# Patient Record
Sex: FEMALE | Race: WHITE | NOT HISPANIC OR LATINO | Employment: OTHER | RURAL
[De-identification: names, ages, dates, MRNs, and addresses within clinical notes are randomized per-mention and may not be internally consistent; named-entity substitution may affect disease eponyms.]

---

## 2017-01-10 ENCOUNTER — HISTORICAL (OUTPATIENT)
Dept: ADMINISTRATIVE | Facility: HOSPITAL | Age: 67
End: 2017-01-10

## 2017-01-12 LAB
LAB AP CLINICAL INFORMATION: NORMAL
LAB AP COMMENTS: NORMAL
LAB AP GENERAL CAT - HISTORICAL: NORMAL
LAB AP INTERPRETATION/RESULT - HISTORICAL: NEGATIVE
LAB AP SPECIMEN ADEQUACY - HISTORICAL: NORMAL
LAB AP SPECIMEN SUBMITTED - HISTORICAL: NORMAL

## 2019-01-15 ENCOUNTER — HISTORICAL (OUTPATIENT)
Dept: ADMINISTRATIVE | Facility: HOSPITAL | Age: 69
End: 2019-01-15

## 2020-11-04 ENCOUNTER — HISTORICAL (OUTPATIENT)
Dept: ADMINISTRATIVE | Facility: HOSPITAL | Age: 70
End: 2020-11-04

## 2021-02-02 ENCOUNTER — HISTORICAL (OUTPATIENT)
Dept: ADMINISTRATIVE | Facility: HOSPITAL | Age: 71
End: 2021-02-02

## 2021-11-29 ENCOUNTER — HOSPITAL ENCOUNTER (OUTPATIENT)
Dept: RADIOLOGY | Facility: HOSPITAL | Age: 71
Discharge: HOME | End: 2021-11-29
Payer: MEDICARE

## 2021-11-29 VITALS — BODY MASS INDEX: 35.36 KG/M2 | HEIGHT: 66 IN | WEIGHT: 220 LBS

## 2021-11-29 DIAGNOSIS — Z12.31 VISIT FOR SCREENING MAMMOGRAM: ICD-10-CM

## 2021-11-29 PROCEDURE — 77067 SCR MAMMO BI INCL CAD: CPT | Mod: 26,,, | Performed by: RADIOLOGY

## 2021-11-29 PROCEDURE — 77067 MAMMO DIGITAL SCREENING BILAT: ICD-10-PCS | Mod: 26,,, | Performed by: RADIOLOGY

## 2021-11-29 PROCEDURE — 77067 SCR MAMMO BI INCL CAD: CPT | Mod: TC

## 2022-12-12 ENCOUNTER — HOSPITAL ENCOUNTER (OUTPATIENT)
Dept: RADIOLOGY | Facility: HOSPITAL | Age: 72
Discharge: HOME | End: 2022-12-12
Payer: MEDICARE

## 2022-12-12 DIAGNOSIS — Z12.31 VISIT FOR SCREENING MAMMOGRAM: ICD-10-CM

## 2022-12-12 PROCEDURE — 77067 SCR MAMMO BI INCL CAD: CPT | Mod: 26,,, | Performed by: RADIOLOGY

## 2022-12-12 PROCEDURE — 77067 SCR MAMMO BI INCL CAD: CPT | Mod: TC

## 2022-12-12 PROCEDURE — 77067 MAMMO DIGITAL SCREENING BILAT: ICD-10-PCS | Mod: 26,,, | Performed by: RADIOLOGY

## 2023-09-12 DIAGNOSIS — I63.89 OTHER CEREBRAL INFARCTION: Primary | ICD-10-CM

## 2023-09-14 ENCOUNTER — CLINICAL SUPPORT (OUTPATIENT)
Dept: REHABILITATION | Facility: HOSPITAL | Age: 73
End: 2023-09-14
Payer: MEDICARE

## 2023-09-14 DIAGNOSIS — I63.89 OTHER CEREBRAL INFARCTION: Primary | ICD-10-CM

## 2023-09-14 DIAGNOSIS — R29.898 WEAKNESS OF RIGHT UPPER EXTREMITY: ICD-10-CM

## 2023-09-14 PROCEDURE — 97110 THERAPEUTIC EXERCISES: CPT | Mod: PN

## 2023-09-14 PROCEDURE — 97161 PT EVAL LOW COMPLEX 20 MIN: CPT | Mod: PN

## 2023-09-14 NOTE — PLAN OF CARE
[facility]   Physical Therapy Initial Evaluation    Date: 9/14/2023   Name: Paty Jennings  Clinic Number: [id number]    Therapy Diagnosis:   Encounter Diagnoses   Name Primary?    Other cerebral infarction Yes    Weakness of right upper extremity      Physician: Lennox Saunders DO    Physician Orders: PT Eval and Treat    Medical Diagnosis from Referral: cva   Evaluation Date: 9/14/2023  Updated Plan of Care Due : 10/15/2023  Authorization Period Expiration: medicare   Plan of Care Expiration: end of year   Visit # / Visits authorized: 1/ 20    Time In: 930  Time Out: 1015  Total Appointment Time (timed & untimed codes): 45 minutes    Precautions: Standard    Subjective   Date of onset: pt states she had a light stroke on Tuesday. Pt voices she didn't have a bleed and has mri scheduled.  Pt voices she was having tingling in her arm and trouble using it. She voices that she has been having trouble raising her shoulder and decreased  right   History of current condition - Paty reports: hx below      Medical History:   Past Medical History:   Diagnosis Date    Breast cancer        Surgical History:   Paty Jennings  has a past surgical history that includes Breast biopsy and Breast lumpectomy.    Medications:   Paty currently has no medications in their medication list.    Allergies:   Review of patient's allergies indicates:  Not on File     Imaging, recent ct :      Prior Therapy: none   Social History:   lives with their spouse  Occupation: retired   Prior Level of Function: independent   Current Level of Function: decreased  , decreased balance     Pain:  no pain at present   Patients goals: increased  to same as left, decrease numbness dead weight feeling in her right arm,  increase dexterity in her right hand     Objective       Observation :     Forward Head/Rounded Shoulders :  [x] Yes   [] No   Scapular Winging :  [x] Yes   [] No            Comments :     Cervical Spine  Clearing :        Range of Motion/Strength :                  Left Extremity                                                                        Right Extremity     AROM   PROM  Strength                  Location   AROM    PROM   Strength    170   5   Shoulder    Flexion 139  2+   170  5                      Abduction   90  2+                             45  5                      ER in Adduction 40  2+                          t6  5                      Functional IR T6   2+                           ER in 90 Scaption      50  5    Upper extremity wb on scales        40  2+   140     Elbow         Flexion 140     0                        Extension 0     50lb                    25 lb          Functional Impairments : decreased  , decreased shoulder range of motion           Rotator Cuff : weak rotator cuff        Palpation :  no pain with palpation , soreness in right pectoralis  ac joint                     Limitation/Restriction for FOTO neuro upper extremity  Survey    Therapist reviewed FOTO scores for Paty Jennings on 9/14/2023.   FOTO documents entered into [employer] - see Media section.    Limitation Score: 56 %         TREATMENT          Paty received the treatments listed below:  THERAPEUTIC EXERCISES to develop strength, endurance, ROM, flexibility, and posture for 20  minutes including home ex program         Home Exercises and Patient Education Provided    Education provided:   - pt received home ex program     Written Home Exercises Provided: yes.  Exercises were reviewed and Paty was able to demonstrate them prior to the end of the session.  Paty demonstrated good  understanding of the education provided.     See EMR under Patient Instructions for exercises provided 9/14/2023.    Assessment   Paty is a 72 y.o. female referred to outpatient Physical Therapy with a medical diagnosis of cva . Patient presents with decreased balance right lower extremity and decreased proximal  shoulder strength with decreased rotator cuff strength, decreased  right hand.      Patient prognosis is Excellent.   Patientt will benefit from skilled outpatient Physical Therapy to address the deficits stated above and in the chart below, provide patient /family education, and to maximize patientt's level of independence.     Plan of care discussed with patient: Yes  Patient's spiritual, cultural and educational needs considered and patient is agreeable to the plan of care and goals as stated below:     Anticipated Barriers for therapy: medical diagnosis of cva . Patient presents with decreased balance right lower extremity and decreased proximal shoulder strength with decreased rotator cuff strength, decreased  right hand.    Goals:  Short Term Goals: 4  weeks   Pt will be independent with home ex program   Pt will be able to increas e bilateral shoulder range of motion flex and abd to 160 arom   Be able to increase right upper extremity wb to 50lb   Increase right  to 40b  Be able to stand on right lower extremity x 3 sec       Long Term Goals: 6 weeks   Have full painfree range of motion right shoulder with activity   Increase  right equal to left   Perform all fine motor skills in right upper extremity without difficulty     Plan   Plan of care Certification: 9/14/2023 to 10/14/2023.    Outpatient Physical Therapy 2 times weekly for 4 weeks to include the following interventions: Manual Therapy, Neuromuscular Re-ed, Patient Education, and Therapeutic Exercise      Plan of care has been reestablished with Vivian Duckworth LPTA, Cayla Frausto LPTA, Liza Ndiaye LPTA  and Princess Christensen LPTA.   .     Osman Soriano, PT           I CERTIFY THE NEED FOR THESE SERVICES FURNISHED UNDER THIS PLAN OF TREATMENT AND WHILE UNDER MY CARE.    Physician's comments:      Physician's Signature: ___________________________________________________

## 2023-09-18 ENCOUNTER — CLINICAL SUPPORT (OUTPATIENT)
Dept: REHABILITATION | Facility: HOSPITAL | Age: 73
End: 2023-09-18
Payer: MEDICARE

## 2023-09-18 DIAGNOSIS — I63.89 OTHER CEREBRAL INFARCTION: ICD-10-CM

## 2023-09-18 DIAGNOSIS — R29.898 WEAKNESS OF RIGHT UPPER EXTREMITY: Primary | ICD-10-CM

## 2023-09-18 PROCEDURE — 97110 THERAPEUTIC EXERCISES: CPT | Mod: PN,CQ

## 2023-09-18 PROCEDURE — 97112 NEUROMUSCULAR REEDUCATION: CPT | Mod: PN,CQ

## 2023-09-18 NOTE — PROGRESS NOTES
Physical Therapy Treatment Note     Name: Paty Jennings  Clinic Number: [id number]    Therapy Diagnosis:   Encounter Diagnoses   Name Primary?    Other cerebral infarction     Weakness of right upper extremity Yes     Physician: Lennox Saunders DO    Visit Date: 9/18/2023    Physician Orders: PT Eval and Treat    Medical Diagnosis from Referral: cva   Evaluation Date: 9/14/2023  Updated Plan of Care Due : 10/15/2023  Authorization Period Expiration: medicare   Plan of Care Expiration: end of year   Visit # / Visits authorized: 2/ 20  PTA Visit #: 1    Time In: 1315  Time Out: 1355  Total Billable Time: 40 minutes    Precautions: Standard    Received Plan of Care per Osman Soriano PT     Subjective     Pt reports: no pain; still weak in right upper extremity   She was compliant with home exercise program.  Response to previous treatment: no c/o   Functional change: none yet    Pain: 0/10  Location: right arm    Received Plan of Care per Osman Soriano PT     Objective     Paty received therapeutic exercises to develop strength, ROM, flexibility, and posture for 15 minutes including:  UBE x 4 minutes (fwd/bwd)  Pulleys x 4 minutes   Corner pec stretching, 4x10 second hold   Towel wall slides for BUE flexion x 20 repetitions   Shoulder flexion with 1# weight x 20 repetitions   Elbow flexion/extension with 2# weight x 20 repetitions   Wrist flexion/extension with 2# weight x 20 repetitions     Paty participated in neuromuscular re-education activities to improve: Coordination and Proprioception for 25 minutes. The following activities were included:  BUE scapular retraction with rows and extension x 20 repetitions each, blue  Green web  for flexion, extension x 20 repetitions each  Green web for wrist flexion and extension x 20 repetitions each  Red resistance band flexion, abduction, elbow flexion x 20 repetitions each  Horizontal abduction x 20 repetitions with red band   Gripper for  pronation/supination x 20 repetitions   BUE weight bearing on mat for weight shifting lateral left/right, forward/backward x 10 repetitions aide    Paty participated in dynamic functional therapeutic activities to improve functional performance for 0  minutes, including:      Paty received the following direct contact modalities after being cleared for contraindications:     Paty received the following supervised modalities after being cleared for contradictions:     Home Exercises Provided and Patient Education Provided     Education provided: continue current home exercise program after review, pain free; added red resistance band for use with home exercise program; edu pt may put dried peas/beans in putty for fine motor work    Written Home Exercises Provided: Patient instructed to cont prior HEP.  Exercises were reviewed and Paty was able to demonstrate them prior to the end of the session.  Paty demonstrated good  understanding of the education provided.     See EMR under Patient Instructions for exercises provided  9/14/2023 .    Assessment     Pt gradually improving range of motion and strength; fine motor strength is most limiting at this time per subjective report  Paty Is progressing well towards her goals.   Pt prognosis is Excellent.     Pt will continue to benefit from skilled outpatient physical therapy to address the deficits listed in the problem list box on initial evaluation, provide pt/family education and to maximize pt's level of independence in the home and community environment.      Anticipated barriers to physical therapy: home exercise program compliance     Goals:  Short Term Goals: 4  weeks   Pt will be independent with home ex program   Pt will be able to increase bilateral shoulder range of motion flex and abd to 160 arom   Be able to increase right upper extremity wb to 50lb   Increase right  to 40lb  Be able to stand on right lower extremity x 3 sec      Long  Term Goals: 6 weeks   Have full painfree range of motion right shoulder with activity   Increase  right equal to left   Perform all fine motor skills in right upper extremity without difficulty     Plan     Plan of care Certification: 9/14/2023 to 10/14/2023.     Outpatient Physical Therapy 2 times weekly for 4 weeks to include the following interventions: Manual Therapy, Neuromuscular Re-ed, Patient Education, and Therapeutic Exercise    Continue per Plan of Care and progress as pt able   Cayla Frausto, PTA  9/18/2023

## 2023-09-22 ENCOUNTER — CLINICAL SUPPORT (OUTPATIENT)
Dept: REHABILITATION | Facility: HOSPITAL | Age: 73
End: 2023-09-22
Payer: MEDICARE

## 2023-09-22 DIAGNOSIS — I63.89 OTHER CEREBRAL INFARCTION: Primary | ICD-10-CM

## 2023-09-22 DIAGNOSIS — R29.898 WEAKNESS OF RIGHT UPPER EXTREMITY: ICD-10-CM

## 2023-09-22 PROCEDURE — 97112 NEUROMUSCULAR REEDUCATION: CPT | Mod: PN,CQ

## 2023-09-22 PROCEDURE — 97110 THERAPEUTIC EXERCISES: CPT | Mod: PN,CQ

## 2023-09-22 NOTE — PROGRESS NOTES
Physical Therapy Treatment Note     Name: Paty Jennings  Clinic Number: [id number]    Therapy Diagnosis:   Encounter Diagnoses   Name Primary?    Other cerebral infarction Yes    Weakness of right upper extremity      Physician: Lennox Saunders DO    Visit Date: 9/22/2023    Physician Orders: PT Eval and Treat    Medical Diagnosis from Referral: cva right side   Evaluation Date: 9/14/2023  Updated Plan of Care Due : 10/15/2023  Authorization Period Expiration: medicare   Plan of Care Expiration: end of year 20 visits  Visit # / Visits authorized: 3/ 8  PTA Visit #: 2    Time In: 1315  Time Out: 1355  Total Billable Time: 40 minutes    Precautions: Standard    Received Plan of Care per Osman Soriano PT     Subjective     Pt reports: My arm just feels a little heavier than it should other than that, I'm good.  She was compliant with home exercise program.  Response to previous treatment: no c/o   Functional change: ongoing     Pain: 0/10  Location: right arm    Received Plan of Care per Osman Soriano PT     Objective     Paty received therapeutic exercises to develop strength, ROM, flexibility, and posture for 20 minutes including:  UBE x 5' minutes (fwd/bwd)  Pulleys x 4 minutes   Wrist flexion, extension, ulnar deviation and radial deviation 15 x 3#  Pronation/ supination 15 x 16 oz  Ulnar deviation/ radial deviation 15 x 16 oz.  Red theraband flexion, extension, external rotation, internal rotation all x 10  Pinch  red 1st-4th digits red x 15, 5th digit yellow x 15   Finger adduction squeezed x 10  Double support squats total gym x 20  Double support calf raises total gym x 15      41#  Left shoulder flexion 143    Paty participated in neuromuscular re-education activities to improve: Coordination and Proprioception for 20 minutes. The following activities were included:  BUE scapular retraction with rows and extension x 20 repetitions each, blue  Green web  for flexion, extension x 20  repetitions each  Green web for wrist flexion and extension x 20 repetitions each  Gripper for pronation/supination x 20 repetitions   Slant board x 2'    Home Exercises Provided and Patient Education Provided     Education provided: continue current home exercise program     Written Home Exercises Provided: Patient instructed to cont prior HEP.  Exercises were reviewed and Paty was able to demonstrate them prior to the end of the session.  Paty demonstrated good  understanding of the education provided.     See EMR under Patient Instructions for exercises provided  9/14/2023 .    Assessment     Pt had 16# gain with  strength this treatment, able to left single support balance x 10 secs  Paty Is progressing well towards her goals.   Pt prognosis is Excellent.     Pt will continue to benefit from skilled outpatient physical therapy to address the deficits listed in the problem list box on initial evaluation, provide pt/family education and to maximize pt's level of independence in the home and community environment.      Anticipated barriers to physical therapy: home exercise program compliance     Goals:  Short Term Goals: 4  weeks   Pt will be independent with home ex program   Pt will be able to increase bilateral shoulder range of motion flex and abd to 160 arom   Be able to increase right upper extremity wb to 50lb   Increase right  to 40lb  Be able to stand on right lower extremity x 3 sec -met     Long Term Goals: 6 weeks   Have full painfree range of motion right shoulder with activity   Increase  right equal to left   Perform all fine motor skills in right upper extremity without difficulty     Plan     Plan of care Certification: 9/14/2023 to 10/14/2023.     Outpatient Physical Therapy 2 times weekly for 4 weeks to include the following interventions: Manual Therapy, Neuromuscular Re-ed, Patient Education, and Therapeutic Exercise    Continue per Plan of Care and progress as pt able    Jacquelyn Duckworth, PTA  9/22/2023

## 2023-09-25 ENCOUNTER — CLINICAL SUPPORT (OUTPATIENT)
Dept: REHABILITATION | Facility: HOSPITAL | Age: 73
End: 2023-09-25
Payer: MEDICARE

## 2023-09-25 DIAGNOSIS — R29.898 WEAKNESS OF RIGHT UPPER EXTREMITY: Primary | ICD-10-CM

## 2023-09-25 DIAGNOSIS — I63.89 OTHER CEREBRAL INFARCTION: ICD-10-CM

## 2023-09-25 PROCEDURE — 97112 NEUROMUSCULAR REEDUCATION: CPT | Mod: PN

## 2023-09-25 PROCEDURE — 97110 THERAPEUTIC EXERCISES: CPT | Mod: PN

## 2023-09-25 NOTE — PROGRESS NOTES
Physical Therapy Treatment Note     Name: Paty Jennings  Clinic Number: [id number]    Therapy Diagnosis:   No diagnosis found.    Physician: Lennox Saunders DO    Visit Date: 9/25/2023    Physician Orders: PT Eval and Treat    Medical Diagnosis from Referral: cva right side   Evaluation Date: 9/14/2023  Updated Plan of Care Due : 10/15/2023  Authorization Period Expiration: medicare   Plan of Care Expiration: end of year 20 visits  Visit # / Visits authorized: 4/ 8  PTA Visit #:     Time In: 1315  Time Out: 1355  Total Billable Time: 40 minutes    Precautions: Standard    Received Plan of Care per Osman Soriano PT     Subjective     Pt reports: still feeling a little weird with writing and tingling feeling , pt voices she can now fasten her bra.  She was compliant with home exercise program.  Response to previous treatment: no c/o   Functional change: ongoing     Pain: 0/10  Location: right arm    Received Plan of Care per Osman Soriano PT     Objective     Paty received therapeutic exercises to develop strength, ROM, flexibility, and posture for 20 minutes including:  UBE x 5' minutes (fwd/bwd)      Wrist flexion, extension, ulnar deviation and radial deviation 15 x 3#  Pronation/ supination 15 x 16 oz  Ulnar deviation/ radial deviation 15 x 16 oz.  Red theraband flexion, extension, external rotation, internal rotation all x 10  Pinch  red 1st-4th digits red x 15, 5th digit yellow x 15   Finger adduction squeezed x 10  Double support squats total gym x 20  Double support calf raises total gym x 15      38#   Right shoulder flexion 163    Paty participated in neuromuscular re-education activities to improve: Coordination and Proprioception for 20 minutes. The following activities were included:  BUE scapular retraction with rows and extension x 20 repetitions each, blue  Green web  for flexion, extension x 20 repetitions each  Green web for wrist flexion and extension x 20 repetitions  each  Gripper for pronation/supination x 20 repetitions   Slant board x 2'  Weight bearing on scales shoulder at 90 degrees on wall  with 2lb ball   15lb max  Quadraped on scales  25lb   x 15 reps   Quadraped alternating upper extremity x 10 reps each   Quadraped alternating lower extremit x 10 reps each   Alternating contralateral upper extremity and lower extremity  x 10 reps   Home Exercises Provided and Patient Education Provided     Education provided: continue current home exercise program     Written Home Exercises Provided: Patient instructed to cont prior HEP.  Exercises were reviewed and Paty was able to demonstrate them prior to the end of the session.  Paty demonstrated good  understanding of the education provided.     See EMR under Patient Instructions for exercises provided  9/14/2023 .    Assessment     Pt had 16# gain with  strength this treatment, able to left single support balance x 10 secs  Paty Is progressing well towards her goals.   Pt prognosis is Excellent.     Pt will continue to benefit from skilled outpatient physical therapy to address the deficits listed in the problem list box on initial evaluation, provide pt/family education and to maximize pt's level of independence in the home and community environment.      Anticipated barriers to physical therapy: home exercise program compliance     Goals:  Short Term Goals: 4  weeks   Pt will be independent with home ex program   Pt will be able to increase bilateral shoulder range of motion flex and abd to 160 arom   Be able to increase right upper extremity wb to 50lb   Increase right  to 40lb  Be able to stand on right lower extremity x 3 sec -met     Long Term Goals: 6 weeks   Have full painfree range of motion right shoulder with activity   Increase  right equal to left   Perform all fine motor skills in right upper extremity without difficulty     Plan     Plan of care Certification: 9/14/2023 to 10/14/2023.      Outpatient Physical Therapy 2 times weekly for 4 weeks to include the following interventions: Manual Therapy, Neuromuscular Re-ed, Patient Education, and Therapeutic Exercise    Plan of care has been reestablished with Vivian MELÉNDEZ, Cayla MELÉNDEZ, Liza MELÉNDEZ  and Princess MELÉNDEZ.     Osman Soriano, PT  9/25/2023

## 2023-09-27 ENCOUNTER — CLINICAL SUPPORT (OUTPATIENT)
Dept: REHABILITATION | Facility: HOSPITAL | Age: 73
End: 2023-09-27
Payer: MEDICARE

## 2023-09-27 DIAGNOSIS — I63.89 OTHER CEREBRAL INFARCTION: ICD-10-CM

## 2023-09-27 DIAGNOSIS — R29.898 WEAKNESS OF RIGHT UPPER EXTREMITY: Primary | ICD-10-CM

## 2023-09-27 PROCEDURE — 97112 NEUROMUSCULAR REEDUCATION: CPT | Mod: PN,CQ

## 2023-09-27 PROCEDURE — 97110 THERAPEUTIC EXERCISES: CPT | Mod: PN,CQ

## 2023-09-27 NOTE — PROGRESS NOTES
Physical Therapy Treatment Note     Name: Paty Jennings  Clinic Number: [id number]    Therapy Diagnosis:   Encounter Diagnoses   Name Primary?    Weakness of right upper extremity Yes    Other cerebral infarction        Physician: Lennox Saunders DO    Visit Date: 9/27/2023    Physician Orders: PT Eval and Treat    Medical Diagnosis from Referral: cva right side   Evaluation Date: 9/14/2023  Updated Plan of Care Due : 10/15/2023  Authorization Period Expiration: medicare   Plan of Care Expiration: end of year 20 visits  Visit # / Visits authorized: 5/ 8  PTA Visit #: 1    Time In: 115  Time Out: 200  Total Billable Time: 45 minutes    Precautions: Standard    Received Plan of Care per Osman Soriano PT     Subjective     Pt reports: I cooked apple  mini cakes today, peeled apples and chopped them. I was sore from last treatment.  She was compliant with home exercise program.  Response to previous treatment: no c/o   Functional change: ongoing     Pain: 0/10  Location: right arm    Received Plan of Care per Osman Soriano PT     Objective     Paty received therapeutic exercises to develop strength, ROM, flexibility, and posture for 25 minutes including:  UBE x 5' minutes (fwd/bwd)      Wrist flexion, extension, ulnar deviation and radial deviation 15 x 3#  Twisting bolt loose and tight with thumbs and each digit  Pronation/ supination 15 x 16 oz  Ulnar deviation/ radial deviation 15 x 16 oz.  Blue theraband flexion, extension, external rotation, internal rotation all x 10  Right single support leg presses 20 x 50#  Prone pulls 10 x 3#  Prone extension 10 x 3#  Prone horizontal abduction x 10    Range of motion     41# right vs non-dominate left 54#   Right shoulder flexion 163    Paty participated in neuromuscular re-education activities to improve: Coordination and Proprioception for 20 minutes. The following activities were included:    BUE scapular retraction with rows and extension x 20 repetitions  each, blue  Pinch  red 1st-4th digit x 10, 5th yellow x 10  Gripper for pronation/supination x 20 repetitions   Slant board x 2'  Weight bearing on scales shoulder at 90 degrees on wall  with 2lb ball   21lb max  Quadraped alternating upper extremity/lower extremity  x 10     Home Exercises Provided and Patient Education Provided     Education provided: continue current home exercise program     Written Home Exercises Provided: Patient instructed to cont prior HEP.  Exercises were reviewed and Paty was able to demonstrate them prior to the end of the session.  Paty demonstrated good  understanding of the education provided.     See EMR under Patient Instructions for exercises provided  9/14/2023 .    Assessment     Pt encouraged to practice writing, puzzles, counting by even and odds forward and backwards, etc. Patient progressing with shoulder strengthening and scapular stability  Paty Is progressing well towards her goals.   Pt prognosis is Excellent.     Pt will continue to benefit from skilled outpatient physical therapy to address the deficits listed in the problem list box on initial evaluation, provide pt/family education and to maximize pt's level of independence in the home and community environment.      Anticipated barriers to physical therapy: home exercise program compliance     Goals:  Short Term Goals: 4  weeks   Pt will be independent with home ex program   Pt will be able to increase bilateral shoulder range of motion flex and abd to 160 arom   Be able to increase right upper extremity wb to 50lb   Increase right  to 40lb  Be able to stand on right lower extremity x 3 sec -met     Long Term Goals: 6 weeks   Have full painfree range of motion right shoulder with activity   Increase  right equal to left   Perform all fine motor skills in right upper extremity without difficulty     Plan     Plan of care Certification: 9/14/2023 to 10/14/2023.     Outpatient Physical Therapy 2  times weekly for 4 weeks to include the following interventions: Manual Therapy, Neuromuscular Re-ed, Patient Education, and Therapeutic Exercise    Plan of care has been reestablished with Vivian MELÉNDEZ, Cayla MELÉNDEZ, Liza MELÉNDEZ  and Princess MELÉNDEZ.     Jacquelyn Duckworth, PTA  9/27/2023

## 2023-10-03 ENCOUNTER — CLINICAL SUPPORT (OUTPATIENT)
Dept: REHABILITATION | Facility: HOSPITAL | Age: 73
End: 2023-10-03
Payer: MEDICARE

## 2023-10-03 DIAGNOSIS — I63.89 OTHER CEREBRAL INFARCTION: Primary | ICD-10-CM

## 2023-10-03 DIAGNOSIS — R29.898 WEAKNESS OF RIGHT UPPER EXTREMITY: ICD-10-CM

## 2023-10-03 PROCEDURE — 97110 THERAPEUTIC EXERCISES: CPT | Mod: PN

## 2023-10-03 PROCEDURE — 97112 NEUROMUSCULAR REEDUCATION: CPT | Mod: PN

## 2023-10-03 NOTE — PROGRESS NOTES
Physical Therapy Treatment Note     Name: Paty Jennings  Clinic Number: [id number]    Therapy Diagnosis:   Encounter Diagnoses   Name Primary?    Other cerebral infarction Yes    Weakness of right upper extremity        Physician: Lennox Saunders DO    Visit Date: 10/3/2023    Physician Orders: PT Eval and Treat    Medical Diagnosis from Referral: cva right side   Evaluation Date: 9/14/2023  Updated Plan of Care Due : 10/15/2023  Authorization Period Expiration: medicare   Plan of Care Expiration: end of year 20 visits  Visit # / Visits authorized: 6/ 8  PTA Visit #:     Time In: 1300  Time Out: 1345  Total Billable Time: 45 minutes    Precautions: Standard    Received Plan of Care per Osman Soriano PT     Subjective     Pt reports: doing much better , able to write better .   She was compliant with home exercise program.  Response to previous treatment: no c/o   Functional change: ongoing     Pain: 0/10  Location: right arm    Received Plan of Care per Osman Soriano PT     Objective     Paty received therapeutic exercises to develop strength, ROM, flexibility, and posture for 25 minutes including:  UBE x 5' minutes (fwd/bwd)      Wrist flexion, extension, ulnar deviation and radial deviation 15 x 3#  Twisting bolt loose and tight with thumbs and each digit  Pronation/ supination 15 x 16 oz  Ulnar deviation/ radial deviation 15 x 16 oz.  Blue theraband flexion, extension, external rotation, internal rotation all x 10  Right single support leg presses 20 x 50#  Prone pulls 10 x 3#  Prone extension 10 x 3#  Prone horizontal abduction x 10    Range of motion     46# right vs non-dominate left 54#   Right shoulder flexion 163    Paty participated in neuromuscular re-education activities to improve: Coordination and Proprioception for 20 minutes. The following activities were included:    BUE scapular retraction with rows and extension x 20 repetitions each, blue  Pinch  red 1st-4th digit x 10, 5th  yellow x 10  Gripper for pronation/supination x 20 repetitions   Slant board x 2'  Weight bearing on scales shoulder at 90 degrees on wall  with 2lb ball   21lb max  Quadraped alternating upper extremity/lower extremity  x 10     Home Exercises Provided and Patient Education Provided     Education provided: continue current home exercise program     Written Home Exercises Provided: Patient instructed to cont prior HEP.  Exercises were reviewed and Paty was able to demonstrate them prior to the end of the session.  Paty demonstrated good  understanding of the education provided.     See EMR under Patient Instructions for exercises provided  9/14/2023 .    Assessment     Pt voices she is cooking and having no troubles now   Paty Is progressing well towards her goals.   Pt prognosis is Excellent.     Pt will continue to benefit from skilled outpatient physical therapy to address the deficits listed in the problem list box on initial evaluation, provide pt/family education and to maximize pt's level of independence in the home and community environment.      Anticipated barriers to physical therapy: home exercise program compliance     Goals:  Short Term Goals: 4  weeks   Pt will be independent with home ex program   Pt will be able to increase bilateral shoulder range of motion flex and abd to 160 arom   Be able to increase right upper extremity wb to 50lb   Increase right  to 40lb  Be able to stand on right lower extremity x 3 sec -met     Long Term Goals: 6 weeks   Have full painfree range of motion right shoulder with activity   Increase  right equal to left   Perform all fine motor skills in right upper extremity without difficulty     Plan     Plan of care Certification: 9/14/2023 to 10/14/2023.     Outpatient Physical Therapy 2 times weekly for 4 weeks to include the following interventions: Manual Therapy, Neuromuscular Re-ed, Patient Education, and Therapeutic Exercise    Plan of care has been  reestablished with Vivian Duckworth LPTA, Cayla Frausto LPTA, Liza Ndiaye LPTA  and Princess Christensen LPTA.     Osman Soriano, PT  10/3/2023

## 2023-10-03 NOTE — PLAN OF CARE
Physical Therapy Treatment Note     Name: Paty Jennings  Clinic Number: [id number]    Therapy Diagnosis:   Encounter Diagnoses   Name Primary?    Other cerebral infarction Yes    Weakness of right upper extremity        Physician: Lennox Saunders DO    Visit Date: 10/3/2023    Physician Orders: PT Eval and Treat    Medical Diagnosis from Referral: cva right side   Evaluation Date: 9/14/2023  Updated Plan of Care Due : 10/15/2023  Authorization Period Expiration: medicare   Plan of Care Expiration: end of year 20 visits  Visit # / Visits authorized: 6/ 8  PTA Visit #:     Time In: 1300  Time Out: 1345  Total Billable Time: 45 minutes    Precautions: Standard    Received Plan of Care per Osman Soriano PT     Subjective     Pt reports: doing much better , able to write better .   She was compliant with home exercise program.  Response to previous treatment: no c/o   Functional change: ongoing     Pain: 0/10  Location: right arm    Received Plan of Care per Osman Soriano PT     Objective     Paty received therapeutic exercises to develop strength, ROM, flexibility, and posture for 25 minutes including:  UBE x 5' minutes (fwd/bwd)      Wrist flexion, extension, ulnar deviation and radial deviation 15 x 3#  Twisting bolt loose and tight with thumbs and each digit  Pronation/ supination 15 x 16 oz  Ulnar deviation/ radial deviation 15 x 16 oz.  Blue theraband flexion, extension, external rotation, internal rotation all x 10  Right single support leg presses 20 x 50#  Prone pulls 10 x 3#  Prone extension 10 x 3#  Prone horizontal abduction x 10    Range of motion     46# right vs non-dominate left 54#   Right shoulder flexion 163    Paty participated in neuromuscular re-education activities to improve: Coordination and Proprioception for 20 minutes. The following activities were included:    BUE scapular retraction with rows and extension x 20 repetitions each, blue  Pinch  red 1st-4th digit x 10, 5th  yellow x 10  Gripper for pronation/supination x 20 repetitions   Slant board x 2'  Weight bearing on scales shoulder at 90 degrees on wall  with 2lb ball   21lb max  Quadraped alternating upper extremity/lower extremity  x 10     Home Exercises Provided and Patient Education Provided     Education provided: continue current home exercise program     Written Home Exercises Provided: Patient instructed to cont prior HEP.  Exercises were reviewed and Paty was able to demonstrate them prior to the end of the session.  Paty demonstrated good  understanding of the education provided.     See EMR under Patient Instructions for exercises provided 9/14/2023.    Assessment     Pt voices she is cooking and having no troubles now   Paty Is progressing well towards her goals.   Pt prognosis is Excellent.     Pt will continue to benefit from skilled outpatient physical therapy to address the deficits listed in the problem list box on initial evaluation, provide pt/family education and to maximize pt's level of independence in the home and community environment.      Anticipated barriers to physical therapy: home exercise program compliance     Goals:  Short Term Goals: 4  weeks   Pt will be independent with home ex program   Pt will be able to increase bilateral shoulder range of motion flex and abd to 160 arom   Be able to increase right upper extremity wb to 50lb   Increase right  to 40lb  Be able to stand on right lower extremity x 3 sec -met     Long Term Goals: 6 weeks   Have full painfree range of motion right shoulder with activity   Increase  right equal to left   Perform all fine motor skills in right upper extremity without difficulty     Plan     Outpatient Therapy Discharge Summary     Name: Paty Jennings  Clinic Number: [id number]    Therapy Diagnosis:   Encounter Diagnoses   Name Primary?    Other cerebral infarction Yes    Weakness of right upper extremity      Physician: Lennox Saunders,  DO         Assessment    Goals:  Pt met all goals.      Discharge reason: Patient has met all of his/her goals    Plan   This patient is discharged from Physical Therapy.   Osman Soriano, PT  10/3/2023

## 2023-12-19 ENCOUNTER — HOSPITAL ENCOUNTER (OUTPATIENT)
Dept: RADIOLOGY | Facility: HOSPITAL | Age: 73
Discharge: HOME | End: 2023-12-19
Payer: MEDICARE

## 2023-12-19 DIAGNOSIS — Z12.31 VISIT FOR SCREENING MAMMOGRAM: ICD-10-CM

## 2023-12-19 PROCEDURE — 77067 MAMMO DIGITAL SCREENING BILAT: ICD-10-PCS | Mod: 26,,, | Performed by: RADIOLOGY

## 2023-12-19 PROCEDURE — 77067 SCR MAMMO BI INCL CAD: CPT | Mod: 26,,, | Performed by: RADIOLOGY

## 2023-12-19 PROCEDURE — 77067 SCR MAMMO BI INCL CAD: CPT | Mod: TC

## 2024-02-19 DIAGNOSIS — I69.30 H/O: STROKE WITH RESIDUAL EFFECTS: Primary | ICD-10-CM

## 2024-02-28 ENCOUNTER — CLINICAL SUPPORT (OUTPATIENT)
Dept: REHABILITATION | Facility: HOSPITAL | Age: 74
End: 2024-02-28
Payer: MEDICARE

## 2024-02-28 DIAGNOSIS — I69.30 H/O: STROKE WITH RESIDUAL EFFECTS: ICD-10-CM

## 2024-02-28 DIAGNOSIS — I63.89 OTHER CEREBRAL INFARCTION: Primary | ICD-10-CM

## 2024-02-28 PROCEDURE — 97110 THERAPEUTIC EXERCISES: CPT | Mod: PN

## 2024-02-28 PROCEDURE — 97161 PT EVAL LOW COMPLEX 20 MIN: CPT | Mod: PN

## 2024-02-28 NOTE — PLAN OF CARE
[facility]   Physical Therapy Initial Evaluation    Date: 2/28/2024   Name: Paty Jennings  Clinic Number: [id number]    Therapy Diagnosis:   Encounter Diagnoses   Name Primary?    H/O: stroke with residual effects     Other cerebral infarction Yes     Physician: Jason Porter,*    Physician Orders: PT Eval and Treat    Medical Diagnosis from Referral: hx of cva , residual effects   Evaluation Date: 2/28/2024  Updated Plan of Care Due : 3/28/2024  Authorization Period Expiration: medicare   Plan of Care Expiration: end of year   Visit # / Visits authorized: 1/ 20    Time In:  1050  Time Out: 1145  Total Appointment Time (timed & untimed codes): 45 minutes    Precautions: Standard    Subjective   Date of onset: pt states she has cva last September 2023.  Pt voices  she still has some right upper extremity weakness in her shoulder. Pt voices that she gets fatigued easily in her legs also.   Pt voices she still has some trouble  in her right hand .  Pt voices she is right hand dominant . Pt voices she has weakness with getting up from a low chair, pt voices she has to use her arms to get up from a chair .      History of current condition - Paty reports: hx below      Medical History:   Past Medical History:   Diagnosis Date    Breast cancer        Surgical History:   Paty Jennings  has a past surgical history that includes Breast biopsy and Breast lumpectomy.    Medications:   Paty currently has no medications in their medication list.    Allergies:   Review of patient's allergies indicates:  Not on File     Imaging, MRI studies:      Prior Therapy: none recent   Social History:   lives with their spouse  Occupation: retired   Prior Level of Function: independent prior to stroke   Current Level of Function: weakness in hips , lower extremity and right upper extremity     Pain:  No pain present     Patients goals: be  able to shop and do yard work without getting tired.       Objective      Shoulder              flexion             right      155           4/5                  left  165    5/5                                Abduction     right      125          3+/5               left   145    5/5                                      Range of Motion/Strength :                  Left Extremity                                                                        Right Extremity   AROM PROM Strength  Location  AROM    PROM   Strength   110  5   Hip      Flexion 95   4   10  4               Extension 8  3                      25  5               Abduction 0  3                        130  5   Knee    Flexion 130  4   0  5                Extension 0  4   12  5   Ankle   Dorsiflexion 12  4   45  5                Plantar Flexion 45  4   25  5                Inversion 25  4   10  5                Eversion 10  4               right 40lb           left 50lb     Functional Impairments :  decreased knee range of motion and strength      Limitation/Restriction for lower extremity  knee Survey    Therapist reviewed FOTO scores for Paty Jennings on 2/28/2024.   FOTO documents entered into [employer] - see Media section.    Limitation Score: 47%         TREATMENT         Paty received the treatments listed below:  THERAPEUTIC EXERCISES to develop strength, endurance, ROM, flexibility, and posture for 20  minutes including home ex program         Home Exercises and Patient Education Provided    Education provided:   - Pt performed and received home ex program.     Written Home Exercises Provided: yes.  Exercises were reviewed and Paty was able to demonstrate them prior to the end of the session.  Paty demonstrated good  understanding of the education provided.     See EMR under Patient Instructions for exercises provided 2/28/2024.    Assessment   Paty is a 73 y.o. female referred to outpatient Physical Therapy with a medical diagnosis of  stroke with residual effects . Patient presents  with decreased range of motion and strength in right hip , weak hip abd , flexion  extension, pt has right shoulder pain from rotator cuff weakness. Decreased right  vs left .     Patient prognosis is Excellent.   Patientt will benefit from skilled outpatient Physical Therapy to address the deficits stated above and in the chart below, provide patient /family education, and to maximize patientt's level of independence.     Plan of care discussed with patient: Yes  Patient's spiritual, cultural and educational needs considered and patient is agreeable to the plan of care and goals as stated below:     Anticipated Barriers for therapy: medical diagnosis of  stroke with residual effects . Patient presents with decreased range of motion and strength in right hip , weak hip abd , flexion  extension, pt has right shoulder pain from rotator cuff weakness. Decreased right  vs left .       Goals:    Short Term Goals: 4 weeks   Pt will be independent with home ex program   Pt will be able to increase right shoulder flexion and abd to equal of left 160 degrees flexion and abd   Pt will be able to increase right hip abd to 15 degrees vs gravity .   Increase lower extremity mmt to 4/5     Long Term Goals: 8 weeks   Pt will be able to return to yardwork pain free  Pt will be able to lift into cabinets pain free   Increase right  equal to left     Plan   Plan of care Certification: 2/28/2024 to 4/28/2024.    Outpatient Physical Therapy 2  times weekly for 8 weeks to include the following interventions: Manual Therapy, Neuromuscular Re-ed, Patient Education, Therapeutic Activities, and Therapeutic Exercise. Modalities as needed.    Plan of care has been reestablished with Vivian Duckworth LPTA, Cayla Frausto LPTA, Liza Ndiaye LPTA  and Princess Christensen LPTA.       Osman Soriano, PT          I CERTIFY THE NEED FOR THESE SERVICES FURNISHED UNDER THIS PLAN OF TREATMENT AND WHILE UNDER MY CARE.    Physician's  comments:      Physician's Signature: ___________________________________________________

## 2024-03-01 ENCOUNTER — CLINICAL SUPPORT (OUTPATIENT)
Dept: REHABILITATION | Facility: HOSPITAL | Age: 74
End: 2024-03-01
Payer: MEDICARE

## 2024-03-01 DIAGNOSIS — M62.81 MUSCLE WEAKNESS OF LOWER EXTREMITY: ICD-10-CM

## 2024-03-01 DIAGNOSIS — R29.898 WEAKNESS OF RIGHT UPPER EXTREMITY: ICD-10-CM

## 2024-03-01 DIAGNOSIS — I69.30 H/O: STROKE WITH RESIDUAL EFFECTS: ICD-10-CM

## 2024-03-01 DIAGNOSIS — I63.89 OTHER CEREBRAL INFARCTION: Primary | ICD-10-CM

## 2024-03-01 PROCEDURE — 97530 THERAPEUTIC ACTIVITIES: CPT | Mod: PN

## 2024-03-01 PROCEDURE — 97112 NEUROMUSCULAR REEDUCATION: CPT | Mod: PN

## 2024-03-01 NOTE — PROGRESS NOTES
Physical Therapy Treatment Note     Name: Paty Jennings  Clinic Number: [id number]    Therapy Diagnosis:residual effects of stroke   Physician: Jason Porter,*    Visit Date: 3/1/2024  Physician Orders: PT Eval and Treat    Medical Diagnosis from Referral: hx of cva , residual effects   Evaluation Date: 2/28/2024  Updated Plan of Care Due : 3/28/2024  Authorization Period Expiration: medicare   Plan of Care Expiration: end of year   Visit # / Visits authorized: 2 /20     PTA Visit #: 0    Time In: 1237  Time Out: 1330  Total Billable Time: 58 minutes    Precautions: Standard       Subjective     Pt reports: pt voices she does have a little weakness in the right lower extremity and right shoulder .     Functional change: improving balance     Pain: 0/10  Location: right lower extremity       Objective       Paty participated in neuromuscular re-education activities to improve: Balance and Posture for 25 minutes. The following activities were included:    Right 50lb leg press x 20 reps for quad and glute strength.   Bilateral hip flexion for glute medius strength x 12 reps with 30lb   Bilateral hip abduction x 12 reps with 30lb   Scapular retraction with blue x 15 reps   Right prone pull x 10 reps with 2lb   Right prone extension x 10 reps with 2lb   Right prone horizontal abd x 10 reps with 2lb  Right prone lower trap x 10 reps with 2lb     Paty participated in dynamic functional therapeutic activities to improve functional performance for 15  minutes, including:    Biking x 5 min   Standing on right lower extremity balancing  holding 2lb weight in right hand 5 sec x 10 reps   Standing on right lower extremity balancing holding 2lb weight in right hand 5 sec x 10 reps             Shoulder   flexion   168                    Abd     140      Home Exercises Provided and Patient Education Provided     Education provided: cont home ex program     Written Home Exercises Provided: Patient instructed to  cont prior HEP.  Exercises were reviewed and Paty was able to demonstrate them prior to the end of the session.  Paty demonstrated good  understanding of the education provided.     See EMR under Patient Instructions for exercises provided 3/1/2024.    Assessment     Pt has improved range of motion   Paty Is progressing well towards her goals.   Pt prognosis is Good.     Pt will continue to benefit from skilled outpatient physical therapy to address the deficits listed in the problem list box on initial evaluation, provide pt/family education and to maximize pt's level of independence in the home and community environment.     Pt's spiritual, cultural and educational needs considered and pt agreeable to plan of care and goals.   Anticipated Barriers for therapy: medical diagnosis of  stroke with residual effects . Patient presents with decreased range of motion and strength in right hip , weak hip abd , flexion  extension, pt has right shoulder pain from rotator cuff weakness. Decreased right  vs left .         Goals:     Short Term Goals: 4 weeks   Pt will be independent with home ex program   Pt will be able to increase right shoulder flexion and abd to equal of left 160 degrees flexion and abd   Pt will be able to increase right hip abd to 15 degrees vs gravity .   Increase lower extremity mmt to 4/5      Long Term Goals: 8 weeks   Pt will be able to return to yardwork pain free  Pt will be able to lift into cabinets pain free   Increase right  equal to left      Plan   Plan of care Certification: 2/28/2024 to 4/28/2024.     Outpatient Physical Therapy 2  times weekly for 8 weeks to include the following interventions: Manual Therapy, Neuromuscular Re-ed, Patient Education, Therapeutic Activities, and Therapeutic Exercise. Modalities as needed.     Plan of care has been reestablished with Vivian Duckworth LPTA, Cayla Frausto LPTA, Liza Ndiaye LPTA  and Princess Christensen LPTA.      Osman Soriano,  PT  3/1/2024

## 2024-03-05 ENCOUNTER — CLINICAL SUPPORT (OUTPATIENT)
Dept: REHABILITATION | Facility: HOSPITAL | Age: 74
End: 2024-03-05
Payer: MEDICARE

## 2024-03-05 DIAGNOSIS — R29.898 WEAKNESS OF RIGHT UPPER EXTREMITY: ICD-10-CM

## 2024-03-05 DIAGNOSIS — M62.81 MUSCLE WEAKNESS OF LOWER EXTREMITY: ICD-10-CM

## 2024-03-05 DIAGNOSIS — I69.30 H/O: STROKE WITH RESIDUAL EFFECTS: Primary | ICD-10-CM

## 2024-03-05 PROCEDURE — 97530 THERAPEUTIC ACTIVITIES: CPT | Mod: PN,CQ

## 2024-03-05 PROCEDURE — 97112 NEUROMUSCULAR REEDUCATION: CPT | Mod: PN,CQ

## 2024-03-05 NOTE — PROGRESS NOTES
Physical Therapy Treatment Note     Name: Paty Jennings  Clinic Number: [id number]    Therapy Diagnosis:residual effects of stroke   Physician: Jason Porter,*    Visit Date: 3/5/2024  Physician Orders: PT Eval and Treat    Medical Diagnosis from Referral: hx of cva , residual effects   Evaluation Date: 2/28/2024  Updated Plan of Care Due : 3/28/2024  Authorization Period Expiration: medicare   Plan of Care Expiration: end of year   Visit # / Visits authorized: 3 /20  PTA Visit #: 1    Time In: 1102  Time Out: 1142  Total Billable Time: 40 minutes    Precautions: Standard     Subjective     Pt reports: my balance is getting better  Functional change: improving balance     Pain: 0/10  Location: right lower extremity       Objective       Paty participated in neuromuscular re-education activities to improve: Balance and Posture for 20 minutes. The following activities were included:  Slant board x 2' with glut set  Scapular retraction thumbs out blue x 10  Scapular retraction blue x 10   Right prone pull x 10 reps with 2lb   Right prone extension x 10 reps with 2lb   Right prone horizontal abd x 10 reps   Right prone lower trap x 10 reps   Standing on right lower extremity balancing with left shoulder above head x 10  Standing on left  lower extremity balancing with right shoulder above head x 10   Bilateral single support bridging x 10    Paty participated in dynamic functional therapeutic activities to improve functional performance for 20 minutes, including:    Biking x 5 min to  improve endurance with walking  Right 50lb leg press x 20 reps for quad and glute strength  Double support squats total gym x 10 to simulate  squatting and bending  Double support calf raises total gym to  simulate reaching in tip toes.    Bilateral hip cybex flexion, abduction and adduction all 10 x 30 to promote hip stability with gait  Sitting to standing without upper extremity support x 10 to improve getting up off  couch/toilet      Shoulder   flexion   165                    Abd     145    Home Exercises Provided and Patient Education Provided     Education provided: cont home ex program     Written Home Exercises Provided: Patient instructed to cont prior HEP.  Exercises were reviewed and Paty was able to demonstrate them prior to the end of the session.  Paty demonstrated good  understanding of the education provided.     See EMR under Patient Instructions for exercises provided 3/1/2024.    Assessment     Pt had improved standing  balance after several repetitions of activities. Patient fatigued quickly with single support bridging.  Paty Is progressing well towards her goals.   Pt prognosis is Good.     Pt will continue to benefit from skilled outpatient physical therapy to address the deficits listed in the problem list box on initial evaluation, provide pt/family education and to maximize pt's level of independence in the home and community environment.     Pt's spiritual, cultural and educational needs considered and pt agreeable to plan of care and goals.   Anticipated Barriers for therapy: medical diagnosis of  stroke with residual effects . Patient presents with decreased range of motion and strength in right hip , weak hip abd , flexion  extension, pt has right shoulder pain from rotator cuff weakness. Decreased right  vs left .         Goals:     Short Term Goals: 4 weeks   Pt will be independent with home ex program   Pt will be able to increase right shoulder flexion and abd to equal of left 160 degrees flexion and abd   Pt will be able to increase right hip abd to 15 degrees vs gravity .   Increase lower extremity mmt to 4/5      Long Term Goals: 8 weeks   Pt will be able to return to yardwork pain free  Pt will be able to lift into cabinets pain free   Increase right  equal to left      Plan   Plan of care Certification: 2/28/2024 to 4/28/2024.     Outpatient Physical Therapy 2  times weekly  for 8 weeks to include the following interventions: Manual Therapy, Neuromuscular Re-ed, Patient Education, Therapeutic Activities, and Therapeutic Exercise. Modalities as needed.     Plan of care has been reestablished with Vivian MELÉNDEZ, Cayla MELÉNDEZ, Liza MELÉNDEZ  and Princess MELÉNDEZ.      Jacquelyn Duckworth, PTA  3/5/2024

## 2024-03-08 ENCOUNTER — CLINICAL SUPPORT (OUTPATIENT)
Dept: REHABILITATION | Facility: HOSPITAL | Age: 74
End: 2024-03-08
Payer: MEDICARE

## 2024-03-08 DIAGNOSIS — I69.30 H/O: STROKE WITH RESIDUAL EFFECTS: Primary | ICD-10-CM

## 2024-03-08 DIAGNOSIS — R29.898 WEAKNESS OF RIGHT UPPER EXTREMITY: ICD-10-CM

## 2024-03-08 DIAGNOSIS — I63.89 OTHER CEREBRAL INFARCTION: ICD-10-CM

## 2024-03-08 DIAGNOSIS — M62.81 MUSCLE WEAKNESS OF LOWER EXTREMITY: ICD-10-CM

## 2024-03-08 PROCEDURE — 97112 NEUROMUSCULAR REEDUCATION: CPT | Mod: PN,CQ

## 2024-03-08 PROCEDURE — 97530 THERAPEUTIC ACTIVITIES: CPT | Mod: PN,CQ

## 2024-03-08 NOTE — PROGRESS NOTES
Physical Therapy Treatment Note     Name: Paty Jennings  Clinic Number: [id number]    Therapy Diagnosis:residual effects of stroke   Physician: Jason Porter,*    Visit Date: 3/8/2024  Physician Orders: PT Eval and Treat    Medical Diagnosis from Referral: hx of cva , residual effects   Evaluation Date: 2/28/2024  Updated Plan of Care Due : 3/28/2024  Authorization Period Expiration: medicare   Plan of Care Expiration: end of year   Visit # / Visits authorized: 4 /20 foto next visit  PTA Visit #: 2    Time In: 1118  Time Out: 1143  Total Billable Time: 25 minutes    Precautions: Standard     Subjective     Pt reports: my balance is getting better and I think I can just keep doing this stuff at home  Functional change: improving balance     Pain: 0/10  Location: right lower extremity       Objective       Paty participated in neuromuscular re-education activities to improve: Balance and Posture for 8  minutes. The following activities were included:  Slant board x 2' with glut set   Standing on right lower extremity balancing with left shoulder above head x 10  Standing on left  lower extremity balancing with right shoulder above head x 10       Paty participated in dynamic functional therapeutic activities to improve functional performance for 17 minutes, including:    Biking x 5 min to  improve endurance with walking  Right 50lb leg press x 20 reps for quad and glute strength    Bilateral hip cybex flexion, abduction and adduction all 10 x 30 to promote hip stability with gait  Sitting to standing without upper extremity support x 10 to improve getting up off couch/toilet      Shoulder   flexion   165                    Abd     145   left 43#    Home Exercises Provided and Patient Education Provided     Education provided: cont home ex program     Written Home Exercises Provided: Patient instructed to cont prior HEP.  Exercises were reviewed and Paty was able to demonstrate them prior to  the end of the session.  Paty demonstrated good  understanding of the education provided.     See EMR under Patient Instructions for exercises provided 3/1/2024.    Assessment     Pt instructed to be consistent with home exercise program, has met 3 of 4 stg's and 2 of 3 LTG 's.  Paty Is progressing well towards her goals.   Pt prognosis is Good.     Pt will continue to benefit from skilled outpatient physical therapy to address the deficits listed in the problem list box on initial evaluation, provide pt/family education and to maximize pt's level of independence in the home and community environment.     Pt's spiritual, cultural and educational needs considered and pt agreeable to plan of care and goals.   Anticipated Barriers for therapy: medical diagnosis of  stroke with residual effects . Patient presents with decreased range of motion and strength in right hip , weak hip abd , flexion  extension, pt has right shoulder pain from rotator cuff weakness. Decreased right  vs left .         Goals:     Short Term Goals: 4 weeks   Pt will be independent with home ex program -met  Pt will be able to increase right shoulder flexion and abd to equal of left 160 degrees flexion and abd   Pt will be able to increase right hip abd to 15 degrees vs gravity -met  Increase lower extremity mmt to 4/5-met       Long Term Goals: 8 weeks   Pt will be able to return to yardwork pain free-met  Pt will be able to lift into cabinets pain free -met  Increase right  equal to left      Plan   Will d/c to home exercise program, see physical therapist d/c note.     Jacquelyn Duckworth, PTA  3/8/2024

## 2024-03-08 NOTE — PLAN OF CARE
Physical Therapy Treatment Note      Name: Paty Jennings  Clinic Number: [id number]     Therapy Diagnosis:residual effects of stroke   Physician: Jason Porter,*     Visit Date: 3/8/2024  Physician Orders: PT Eval and Treat    Medical Diagnosis from Referral: hx of cva , residual effects   Evaluation Date: 2/28/2024  Updated Plan of Care Due : 3/28/2024  Authorization Period Expiration: medicare   Plan of Care Expiration: end of year   Visit # / Visits authorized: 4 /20 foto next visit  PTA Visit #: 2     Time In: 1118  Time Out: 1143  Total Billable Time: 25 minutes     Precautions: Standard     Subjective      Pt reports: my balance is getting better and I think I can just keep doing this stuff at home  Functional change: improving balance      Pain: 0/10  Location: right lower extremity        Objective         Paty participated in neuromuscular re-education activities to improve: Balance and Posture for 8  minutes. The following activities were included:  Slant board x 2' with glut set   Standing on right lower extremity balancing with left shoulder above head x 10  Standing on left  lower extremity balancing with right shoulder above head x 10         Paty participated in dynamic functional therapeutic activities to improve functional performance for 17 minutes, including:     Biking x 5 min to  improve endurance with walking  Right 50lb leg press x 20 reps for quad and glute strength    Bilateral hip cybex flexion, abduction and adduction all 10 x 30 to promote hip stability with gait  Sitting to standing without upper extremity support x 10 to improve getting up off couch/toilet        Shoulder   flexion   165                    Abd     145   left 43#     Home Exercises Provided and Patient Education Provided      Education provided: cont home ex program      Written Home Exercises Provided: Patient instructed to cont prior HEP.  Exercises were reviewed and Paty was able to  demonstrate them prior to the end of the session.  Paty demonstrated good  understanding of the education provided.      See EMR under Patient Instructions for exercises provided 3/1/2024.     Assessment      Pt instructed to be consistent with home exercise program, has met 3 of 4 stg's and 2 of 3 LTG 's.  Paty Is progressing well towards her goals.   Pt prognosis is Good.      Pt will continue to benefit from skilled outpatient physical therapy to address the deficits listed in the problem list box on initial evaluation, provide pt/family education and to maximize pt's level of independence in the home and community environment.      Pt's spiritual, cultural and educational needs considered and pt agreeable to plan of care and goals.   Anticipated Barriers for therapy: medical diagnosis of  stroke with residual effects . Patient presents with decreased range of motion and strength in right hip , weak hip abd , flexion  extension, pt has right shoulder pain from rotator cuff weakness. Decreased right  vs left .         Goals:     Short Term Goals: 4 weeks   Pt will be independent with home ex program -met  Pt will be able to increase right shoulder flexion and abd to equal of left 160 degrees flexion and abd   Pt will be able to increase right hip abd to 15 degrees vs gravity -met  Increase lower extremity mmt to 4/5-met       Long Term Goals: 8 weeks   Pt will be able to return to yardwork pain free-met  Pt will be able to lift into cabinets pain free -met  Increase right  equal to left       Outpatient Therapy Discharge Summary     Name: Paty Jennings  Clinic Number: [id number]    Therapy Diagnosis:   Encounter Diagnoses   Name Primary?    H/O: stroke with residual effects Yes    Weakness of right upper extremity     Muscle weakness of lower extremity     Other cerebral infarction      Physician: Jason Porter,*         Assessment    Goals:  Pt met goals     Discharge reason: Patient has  met all of his/her goals    Plan   This patient is discharged from Physical Therapy.   Osman Soriano, PT

## 2025-06-19 ENCOUNTER — HOSPITAL ENCOUNTER (EMERGENCY)
Facility: HOSPITAL | Age: 75
Discharge: ANOTHER HOSPITAL | End: 2025-06-19
Attending: FAMILY MEDICINE
Payer: MEDICARE

## 2025-06-19 VITALS
SYSTOLIC BLOOD PRESSURE: 116 MMHG | HEART RATE: 133 BPM | WEIGHT: 205 LBS | BODY MASS INDEX: 32.95 KG/M2 | OXYGEN SATURATION: 98 % | HEIGHT: 66 IN | TEMPERATURE: 98 F | DIASTOLIC BLOOD PRESSURE: 53 MMHG | RESPIRATION RATE: 22 BRPM

## 2025-06-19 DIAGNOSIS — R29.818 ACUTE FOCAL NEUROLOGICAL DEFICIT: ICD-10-CM

## 2025-06-19 DIAGNOSIS — R41.82 ALTERED MENTAL STATUS, UNSPECIFIED: ICD-10-CM

## 2025-06-19 DIAGNOSIS — R07.9 CHEST PAIN: ICD-10-CM

## 2025-06-19 DIAGNOSIS — K92.2 UPPER GI BLEED: Primary | ICD-10-CM

## 2025-06-19 LAB
ACETONE SERPL QL SCN: NORMAL
ALBUMIN SERPL BCP-MCNC: 3.8 G/DL (ref 3.4–4.8)
ALBUMIN/GLOB SERPL: 0.9 {RATIO}
ALP SERPL-CCNC: 146 U/L (ref 40–150)
ALT SERPL W P-5'-P-CCNC: 11 U/L
ANION GAP SERPL CALCULATED.3IONS-SCNC: 28 MMOL/L (ref 7–16)
AST SERPL W P-5'-P-CCNC: 19 U/L (ref 11–45)
BASOPHILS # BLD AUTO: 0.05 K/UL (ref 0–0.2)
BASOPHILS NFR BLD AUTO: 0.3 % (ref 0–1)
BILIRUB SERPL-MCNC: 0.4 MG/DL
BILIRUB UR QL STRIP: NEGATIVE
BUN SERPL-MCNC: 17 MG/DL (ref 10–20)
BUN/CREAT SERPL: 15 (ref 6–20)
CALCIUM SERPL-MCNC: 9.5 MG/DL (ref 8.4–10.2)
CHLORIDE SERPL-SCNC: 99 MMOL/L (ref 98–107)
CHOLEST SERPL-MCNC: 234 MG/DL
CHOLEST/HDLC SERPL: 3.3 {RATIO}
CLARITY UR: CLEAR
CO2 SERPL-SCNC: 13 MMOL/L (ref 23–31)
COLOR UR: COLORLESS
CREAT SERPL-MCNC: 1.16 MG/DL (ref 0.55–1.02)
DIFFERENTIAL METHOD BLD: ABNORMAL
EGFR (NO RACE VARIABLE) (RUSH/TITUS): 49 ML/MIN/1.73M2
EOSINOPHIL # BLD AUTO: 0 K/UL (ref 0–0.5)
EOSINOPHIL NFR BLD AUTO: 0 % (ref 1–4)
ERYTHROCYTE [DISTWIDTH] IN BLOOD BY AUTOMATED COUNT: 11.8 % (ref 11.5–14.5)
GLOBULIN SER-MCNC: 4.3 G/DL (ref 2–4)
GLUCOSE SERPL-MCNC: 450 MG/DL (ref 82–115)
GLUCOSE SERPL-MCNC: 454 MG/DL (ref 70–105)
GLUCOSE UR STRIP-MCNC: >1000 MG/DL
HCO3 UR-SCNC: 15.2 MMOL/L (ref 24–28)
HCT VFR BLD AUTO: 43.2 % (ref 38–47)
HCT VFR BLD CALC: 48 % (ref 35–51)
HDLC SERPL-MCNC: 70 MG/DL (ref 35–60)
HGB BLD-MCNC: 13.9 G/DL (ref 12–16)
IMM GRANULOCYTES # BLD AUTO: 0.15 K/UL (ref 0–0.04)
IMM GRANULOCYTES NFR BLD: 0.9 % (ref 0–0.4)
INR BLD: 1.06
KETONES UR STRIP-SCNC: >=150 MG/DL
LDH SERPL L TO P-CCNC: 2.8 MMOL/L (ref 0.3–1.2)
LDLC SERPL CALC-MCNC: 143 MG/DL
LDLC/HDLC SERPL: 2 {RATIO}
LEUKOCYTE ESTERASE UR QL STRIP: NEGATIVE
LYMPHOCYTES # BLD AUTO: 1.09 K/UL (ref 1–4.8)
LYMPHOCYTES NFR BLD AUTO: 6.6 % (ref 27–41)
MCH RBC QN AUTO: 30.8 PG (ref 27–31)
MCHC RBC AUTO-ENTMCNC: 32.2 G/DL (ref 32–36)
MCV RBC AUTO: 95.6 FL (ref 80–96)
MONOCYTES # BLD AUTO: 0.47 K/UL (ref 0–0.8)
MONOCYTES NFR BLD AUTO: 2.9 % (ref 2–6)
MPC BLD CALC-MCNC: 12.4 FL (ref 9.4–12.4)
MUCOUS, UA: ABNORMAL /LPF
NEUTROPHILS # BLD AUTO: 14.71 K/UL (ref 1.8–7.7)
NEUTROPHILS NFR BLD AUTO: 89.3 % (ref 53–65)
NITRITE UR QL STRIP: NEGATIVE
NONHDLC SERPL-MCNC: 164 MG/DL
NRBC # BLD AUTO: 0 X10E3/UL
NRBC, AUTO (.00): 0 %
NT-PROBNP SERPL-MCNC: 547 PG/ML (ref 1–450)
PCO2 BLDA: 39 MMHG (ref 41–51)
PH SMN: 7.2 [PH] (ref 7.32–7.42)
PH UR STRIP: 5 PH UNITS
PLATELET # BLD AUTO: 253 K/UL (ref 150–400)
PO2 BLDA: 23 MMHG (ref 25–40)
POC BASE EXCESS: -12.2 MMOL/L (ref -2–3)
POC CO2: 16.4 MMOL/L
POC IONIZED CALCIUM: 1.2 MMOL/L (ref 1.15–1.35)
POC SATURATED O2: 26 % (ref 40–70)
POCT GLUCOSE: 424 MG/DL (ref 60–95)
POTASSIUM BLD-SCNC: 4.5 MMOL/L (ref 3.4–4.5)
POTASSIUM SERPL-SCNC: 4.6 MMOL/L (ref 3.5–5.1)
PROT SERPL-MCNC: 8.1 G/DL (ref 5.8–7.6)
PROT UR QL STRIP: NEGATIVE
PROTHROMBIN TIME: 13.9 SECONDS (ref 11.7–14.7)
RBC # BLD AUTO: 4.52 M/UL (ref 4.2–5.4)
RBC # UR STRIP: ABNORMAL /UL
RBC #/AREA URNS HPF: <1 /HPF
SODIUM BLD-SCNC: 133 MMOL/L (ref 136–145)
SODIUM SERPL-SCNC: 135 MMOL/L (ref 136–145)
SP GR UR STRIP: 1.04
SQUAMOUS #/AREA URNS LPF: ABNORMAL /HPF
TRIGL SERPL-MCNC: 103 MG/DL (ref 37–140)
TROPONIN I SERPL HS-MCNC: 5.8 NG/L
TROPONIN I SERPL HS-MCNC: 9.2 NG/L
TSH SERPL DL<=0.005 MIU/L-ACNC: 2.41 UIU/ML (ref 0.35–4.94)
UROBILINOGEN UR STRIP-ACNC: NORMAL MG/DL
VLDLC SERPL-MCNC: 21 MG/DL
WBC # BLD AUTO: 16.47 K/UL (ref 4.5–11)
WBC #/AREA URNS HPF: 2 /HPF

## 2025-06-19 PROCEDURE — 25000003 PHARM REV CODE 250: Performed by: FAMILY MEDICINE

## 2025-06-19 PROCEDURE — 80053 COMPREHEN METABOLIC PANEL: CPT | Performed by: FAMILY MEDICINE

## 2025-06-19 PROCEDURE — 85014 HEMATOCRIT: CPT

## 2025-06-19 PROCEDURE — 93010 ELECTROCARDIOGRAM REPORT: CPT | Mod: ,,, | Performed by: STUDENT IN AN ORGANIZED HEALTH CARE EDUCATION/TRAINING PROGRAM

## 2025-06-19 PROCEDURE — 84484 ASSAY OF TROPONIN QUANT: CPT | Performed by: FAMILY MEDICINE

## 2025-06-19 PROCEDURE — 82803 BLOOD GASES ANY COMBINATION: CPT

## 2025-06-19 PROCEDURE — 25500020 PHARM REV CODE 255: Performed by: FAMILY MEDICINE

## 2025-06-19 PROCEDURE — 82962 GLUCOSE BLOOD TEST: CPT

## 2025-06-19 PROCEDURE — 84132 ASSAY OF SERUM POTASSIUM: CPT

## 2025-06-19 PROCEDURE — 84443 ASSAY THYROID STIM HORMONE: CPT | Performed by: FAMILY MEDICINE

## 2025-06-19 PROCEDURE — 80061 LIPID PANEL: CPT | Performed by: FAMILY MEDICINE

## 2025-06-19 PROCEDURE — 82947 ASSAY GLUCOSE BLOOD QUANT: CPT

## 2025-06-19 PROCEDURE — 63600175 PHARM REV CODE 636 W HCPCS: Performed by: FAMILY MEDICINE

## 2025-06-19 PROCEDURE — 82009 KETONE BODYS QUAL: CPT | Performed by: FAMILY MEDICINE

## 2025-06-19 PROCEDURE — 96375 TX/PRO/DX INJ NEW DRUG ADDON: CPT

## 2025-06-19 PROCEDURE — 85610 PROTHROMBIN TIME: CPT | Performed by: FAMILY MEDICINE

## 2025-06-19 PROCEDURE — 83605 ASSAY OF LACTIC ACID: CPT

## 2025-06-19 PROCEDURE — 82330 ASSAY OF CALCIUM: CPT

## 2025-06-19 PROCEDURE — 96361 HYDRATE IV INFUSION ADD-ON: CPT

## 2025-06-19 PROCEDURE — 96376 TX/PRO/DX INJ SAME DRUG ADON: CPT

## 2025-06-19 PROCEDURE — 96374 THER/PROPH/DIAG INJ IV PUSH: CPT

## 2025-06-19 PROCEDURE — G0426 INPT/ED TELECONSULT50: HCPCS | Mod: 95,G0,, | Performed by: STUDENT IN AN ORGANIZED HEALTH CARE EDUCATION/TRAINING PROGRAM

## 2025-06-19 PROCEDURE — 99285 EMERGENCY DEPT VISIT HI MDM: CPT | Mod: 25

## 2025-06-19 PROCEDURE — 85025 COMPLETE CBC W/AUTO DIFF WBC: CPT | Performed by: FAMILY MEDICINE

## 2025-06-19 PROCEDURE — 87040 BLOOD CULTURE FOR BACTERIA: CPT | Performed by: FAMILY MEDICINE

## 2025-06-19 PROCEDURE — 81003 URINALYSIS AUTO W/O SCOPE: CPT | Performed by: FAMILY MEDICINE

## 2025-06-19 PROCEDURE — 83880 ASSAY OF NATRIURETIC PEPTIDE: CPT | Performed by: FAMILY MEDICINE

## 2025-06-19 PROCEDURE — 84295 ASSAY OF SERUM SODIUM: CPT

## 2025-06-19 PROCEDURE — 36415 COLL VENOUS BLD VENIPUNCTURE: CPT | Performed by: FAMILY MEDICINE

## 2025-06-19 PROCEDURE — 93005 ELECTROCARDIOGRAM TRACING: CPT

## 2025-06-19 RX ORDER — PRAVASTATIN SODIUM 40 MG/1
40 TABLET ORAL EVERY MORNING
COMMUNITY
Start: 2025-06-03 | End: 2026-06-03

## 2025-06-19 RX ORDER — DONEPEZIL HYDROCHLORIDE 5 MG/1
5 TABLET, FILM COATED ORAL NIGHTLY
COMMUNITY
Start: 2024-12-02 | End: 2026-06-03

## 2025-06-19 RX ORDER — ONDANSETRON HYDROCHLORIDE 2 MG/ML
INJECTION, SOLUTION INTRAVENOUS
Status: DISCONTINUED
Start: 2025-06-19 | End: 2025-06-19 | Stop reason: HOSPADM

## 2025-06-19 RX ORDER — FLUOXETINE 20 MG/1
20 CAPSULE ORAL DAILY
COMMUNITY

## 2025-06-19 RX ORDER — ONDANSETRON HYDROCHLORIDE 2 MG/ML
4 INJECTION, SOLUTION INTRAVENOUS
Status: COMPLETED | OUTPATIENT
Start: 2025-06-19 | End: 2025-06-19

## 2025-06-19 RX ORDER — ASPIRIN 325 MG
325 TABLET ORAL
Status: COMPLETED | OUTPATIENT
Start: 2025-06-19 | End: 2025-06-19

## 2025-06-19 RX ORDER — PANTOPRAZOLE SODIUM 40 MG/10ML
80 INJECTION, POWDER, LYOPHILIZED, FOR SOLUTION INTRAVENOUS
Status: COMPLETED | OUTPATIENT
Start: 2025-06-19 | End: 2025-06-19

## 2025-06-19 RX ORDER — METFORMIN HYDROCHLORIDE 1000 MG/1
1000 TABLET ORAL 2 TIMES DAILY WITH MEALS
COMMUNITY

## 2025-06-19 RX ORDER — INSULIN ASPART 100 [IU]/ML
10 INJECTION, SOLUTION INTRAVENOUS; SUBCUTANEOUS
COMMUNITY

## 2025-06-19 RX ORDER — ACETAMINOPHEN 650 MG/1
650 SUPPOSITORY RECTAL
Status: COMPLETED | OUTPATIENT
Start: 2025-06-19 | End: 2025-06-19

## 2025-06-19 RX ORDER — CLOPIDOGREL BISULFATE 300 MG/1
300 TABLET, FILM COATED ORAL
Status: COMPLETED | OUTPATIENT
Start: 2025-06-19 | End: 2025-06-19

## 2025-06-19 RX ORDER — IOPAMIDOL 755 MG/ML
100 INJECTION, SOLUTION INTRAVASCULAR
Status: COMPLETED | OUTPATIENT
Start: 2025-06-19 | End: 2025-06-19

## 2025-06-19 RX ORDER — INSULIN GLARGINE 100 [IU]/ML
30 INJECTION, SOLUTION SUBCUTANEOUS DAILY
COMMUNITY

## 2025-06-19 RX ADMIN — SODIUM CHLORIDE 1000 ML: 9 INJECTION, SOLUTION INTRAVENOUS at 01:06

## 2025-06-19 RX ADMIN — ASPIRIN 325 MG ORAL TABLET 325 MG: 325 PILL ORAL at 02:06

## 2025-06-19 RX ADMIN — SODIUM CHLORIDE 1000 ML: 9 INJECTION, SOLUTION INTRAVENOUS at 05:06

## 2025-06-19 RX ADMIN — IOPAMIDOL 100 ML: 755 INJECTION, SOLUTION INTRAVENOUS at 01:06

## 2025-06-19 RX ADMIN — ONDANSETRON 4 MG: 2 INJECTION INTRAMUSCULAR; INTRAVENOUS at 03:06

## 2025-06-19 RX ADMIN — CLOPIDOGREL BISULFATE 300 MG: 300 TABLET, FILM COATED ORAL at 02:06

## 2025-06-19 RX ADMIN — PANTOPRAZOLE SODIUM 80 MG: 40 INJECTION, POWDER, FOR SOLUTION INTRAVENOUS at 04:06

## 2025-06-19 RX ADMIN — HUMAN INSULIN 5 UNITS: 100 INJECTION, SOLUTION SUBCUTANEOUS at 05:06

## 2025-06-19 RX ADMIN — HUMAN INSULIN 10 UNITS: 100 INJECTION, SOLUTION SUBCUTANEOUS at 01:06

## 2025-06-19 RX ADMIN — ACETAMINOPHEN 650 MG: 650 SUPPOSITORY RECTAL at 04:06

## 2025-06-19 NOTE — SUBJECTIVE & OBJECTIVE
HPI:  74 y.o. female with a PMHx significant for prior stroke (left CR, 2023), HTN, HLD, type II DM presenting with altered mentation, nausea/vomiting in the setting of hyperglycemia ().     LKN 1800 yesterday.     Vitals:    06/19/25 1247   BP: 124/69   Pulse: (!) 130   Resp: (!) 22   Temp: 100 °F (37.8 °C)          Images personally reviewed and interpreted:  Study: Head CT and CTA Head & Neck  Study Interpretation: No acute intracranial hemorrhage. Hypoattenuation of the left paramedian occipital lobe concerning for recent infarction. Distal left PCA (P2/3) occlusion, focal stenosis of the proximal left vertebral artery; no large vessel occlusion amenable for EVT.      Assessment and plan:  # Stroke due to occlusion of left posterior cerebral artery    Lytics recommendation: Thrombolytic therapy not recommended due to Outside of treatment window   Thrombectomy recommendation: No; Large core infarct on imaging  and distal location of the occlusion.   Placement recommendation: Recommend admission for stroke work-up.    Imaging:   - Recommend follow-up non-urgent MRI brain without contrast to evaluate stroke burden/other areas of ischemia.  - If above studies are abnormal, please load images to teleneurology imaging system and contact us to review.    Antithrombotics for secondary stroke prevention: Load aspirin 325mg, clopidogrel 300mg x 1 now. Then start dual-antiplatelet therapy with ASA 81mg and clopidogrel 75mg daily for 21 days. Then continue ASA 81mg indefinitely.     Statins for secondary stroke prevention and hyperlipidemia, if present: Recommend initiation or continuation of a high-intensity statin for goal LDL < 70mg/dL.    Aggressive risk factor modification: HTN, DM, HLD     Rehab efforts: The patient has been evaluated by a stroke team provider and the therapy needs have been fully considered based off the presenting complaints and exam findings. The following therapy evaluations are  needed: PT evaluate and treat, OT evaluate and treat, SLP evaluate and treat, PM&R evaluate for appropriate placement    Diagnostics recommended:   - MRI brain without contrast   - TTE without bubble study, monitor on telemetry while admitted  - Labs: hemoglobin A1c (goal <7%), lipid panel (LDL goal <70mg/dL), TSH  - Treat hyperglycemia to achieve a blood glucose level in a range of 140-180 mg/dL and to closely monitor to prevent hypoglycemia (Class IIa, Level C-LD).    VTE prophylaxis: Enoxaparin 40 mg SQ every 24 hours  Mechanical prophylaxis: Place SCDs    BP parameters: Infarct: No intervention, SBP <220    - Patient may be at risk for worsening deficits with positional changes or drops in blood pressure  (pressure-dependent state), or if there is extension of stroke or development of cerebral edema;  as such, recommend close neurological monitoring.  - Recommend permissive hypertension for the initial 24 to 48 hours of acute ischemic stroke unless the blood pressure is >220 mm Hg systolic or >120 mm Hg diastolic, or there is a concomitant specific medical condition that would benefit from blood pressure lowering (e.g., MI, aortic dissection).  - In the setting of possible hypovolemia, initiate IV fluids to maintain adequate hydration and euvolemia (although be cautious in patients at risk of fluid overload, such as those with renal or heart failure).  - The optimal time after the onset of acute ischemic stroke to restart or start long-term antihypertensive therapy has not been established and may depend on various patient and stroke characteristics (e.g., pressure dependent state), but in most patients it is reasonable to initiate long-term antihypertensive therapy after the initial 48 to 72 hours from stroke onset.    Please contact us with any further questions or concerns or if patient has any acute neurological changes (new symptoms, worsening deficits).

## 2025-06-19 NOTE — ED NOTES
[facility] was contacted per Dr Wu request for telestroke consult, Dr Marrero with neurology was notified at this time.

## 2025-06-19 NOTE — ED NOTES
Remains A/A but with garbled incomprehensible speech for the most part;  resp even and nonlabored;  no further vomiting since previous episode ;  Pt is able to follow most simple one step commands -  purposeful movement seen from all extremities except for RUE;  she shows neglect to her right side as well - she will not acknowledge you if you are standing to her right side nor will she attempt to follow movement to that side as well;  she remains tachy in the 120-130 range;  NS infusing at Wide open rate   Detail Level: Detailed Render Note In Bullet Format When Appropriate: No Show Aperture Variable?: Yes Post-Care Instructions: I reviewed with the patient in detail post-care instructions. Patient is to wear sunprotection, and avoid picking at any of the treated lesions. Pt may apply Vaseline to crusted or scabbing areas. Consent: The patient's consent was obtained including but not limited to risks of crusting, scabbing, blistering, scarring, darker or lighter pigmentary change, recurrence, incomplete removal and infection. Number Of Freeze-Thaw Cycles: 1 freeze-thaw cycle Duration Of Freeze Thaw-Cycle (Seconds): 3

## 2025-06-19 NOTE — TELEMEDICINE CONSULT
[facility]  Vascular Neurology  Comprehensive Stroke Center  TeleVascular Neurology Acute Consultation Note        Consult Information  Consult to Telemedicine - Acute Stroke  Consult performed by: Casi Marrero MD  Consult ordered by: Enedina Lim DO          Consulting Provider: ENEDINA LIM.   Current Providers  No providers found    Patient Location:  [facility] EMERGENCY DEPART* Emergency Department    Spoke hospital nurse at bedside with patient assisting consultant.  Patient information was obtained from relative(s) and primary team.       Stroke Documentation  Acute Stroke Times   Last Known Normal Date: 06/18/25  Last Known Normal Time: 1800  Symptom Onset Date: 06/18/25  Symptom Onset Time: 1800  Stroke Team Called Date: 06/19/25  Stroke Team Called Time: 1401  Stroke Team Arrival Date: 06/19/25  Stroke Team Arrival Time: 1401  CT Interpretation Time: 1401  Thrombolytic Therapy Recommended: No  Thrombectomy Recommended: No    NIH Scale:  Interval: baseline  1a. Level of Consciousness: 0-->Alert, keenly responsive  1b. LOC Questions: 2-->Answers neither question correctly  1c. LOC Commands: 2-->Performs neither task correctly  2. Best Gaze: 0-->Normal  3. Visual: 2-->Complete hemianopia  4. Facial Palsy: 0-->Normal symmetrical movements  5a. Motor Arm, Left: 0-->No drift, limb holds 90 (or 45) degrees for full 10 secs  5b. Motor Arm, Right: 0-->No drift, limb holds 90 (or 45) degrees for full 10 secs  6a. Motor Leg, Left: 0-->No drift, leg holds 30 degree position for full 5 secs  6b. Motor Leg, Right: 0-->No drift, leg holds 30 degree position for full 5 secs  7. Limb Ataxia: 0-->Absent  8. Sensory: 0-->Normal, no sensory loss  9. Best Language: 1-->Mild-to-moderate aphasia, some obvious loss of fluency or facility of comprehension, without significant limitation on ideas expressed or form of expression. Reduction of speech and/or comprehension, however, makes  "conversation. . . (see row details)  10. Dysarthria: 1-->Mild-to-moderate dysarthria, patient slurs at least some words and, at worst, can be understood with some difficulty  11. Extinction and Inattention (formerly Neglect): 0-->No abnormality  Total (NIH Stroke Scale): 8      Modified [place] Baseline: Score: 0  Modified [place] Discharge:    [place] Coma Scale:     ABCD2 Score:    [id number] Score:    HAS -BLED Score:    ICH Score:    Hunt & Corbett Classification:      Blood pressure (!) 154/69, pulse (!) 139, temperature 100 °F (37.8 °C), resp. rate 20, height 5' 6" (1.676 m), weight 93 kg (205 lb), SpO2 99%.      In my opinion, this was a: Tier 1; VAN Stroke Assessment: Negative     Medical Decision Making  HPI:  74 y.o. female with a PMHx significant for prior stroke (left CR, 2023), HTN, HLD, type II DM presenting with altered mentation, nausea/vomiting in the setting of hyperglycemia ().     LKN 1800 yesterday.     Vitals:    06/19/25 1247   BP: 124/69   Pulse: (!) 130   Resp: (!) 22   Temp: 100 °F (37.8 °C)          Images personally reviewed and interpreted:  Study: Head CT and CTA Head & Neck  Study Interpretation: No acute intracranial hemorrhage. Hypoattenuation of the left paramedian occipital lobe concerning for recent infarction. Distal left PCA (P2/3) occlusion, focal stenosis of the proximal left vertebral artery; no large vessel occlusion amenable for EVT.      Assessment and plan:  # Stroke due to occlusion of left posterior cerebral artery    Lytics recommendation: Thrombolytic therapy not recommended due to Outside of treatment window   Thrombectomy recommendation: No; Large core infarct on imaging  and distal location of the occlusion.   Placement recommendation: Recommend admission for stroke work-up.    Imaging:   - Recommend follow-up non-urgent MRI brain without contrast to evaluate stroke burden/other areas of ischemia.  - If above studies are abnormal, please load images to " teleneurology imaging system and contact us to review.    Antithrombotics for secondary stroke prevention: Load aspirin 325mg, clopidogrel 300mg x 1 now. Then start dual-antiplatelet therapy with ASA 81mg and clopidogrel 75mg daily for 21 days. Then continue ASA 81mg indefinitely.     Statins for secondary stroke prevention and hyperlipidemia, if present: Recommend initiation or continuation of a high-intensity statin for goal LDL < 70mg/dL.    Aggressive risk factor modification: HTN, DM, HLD     Rehab efforts: The patient has been evaluated by a stroke team provider and the therapy needs have been fully considered based off the presenting complaints and exam findings. The following therapy evaluations are needed: PT evaluate and treat, OT evaluate and treat, SLP evaluate and treat, PM&R evaluate for appropriate placement    Diagnostics recommended:   - MRI brain without contrast   - TTE without bubble study, monitor on telemetry while admitted  - Labs: hemoglobin A1c (goal <7%), lipid panel (LDL goal <70mg/dL), TSH  - Treat hyperglycemia to achieve a blood glucose level in a range of 140-180 mg/dL and to closely monitor to prevent hypoglycemia (Class IIa, Level C-LD).    VTE prophylaxis: Enoxaparin 40 mg SQ every 24 hours  Mechanical prophylaxis: Place SCDs    BP parameters: Infarct: No intervention, SBP <220    - Patient may be at risk for worsening deficits with positional changes or drops in blood pressure  (pressure-dependent state), or if there is extension of stroke or development of cerebral edema;  as such, recommend close neurological monitoring.  - Recommend permissive hypertension for the initial 24 to 48 hours of acute ischemic stroke unless the blood pressure is >220 mm Hg systolic or >120 mm Hg diastolic, or there is a concomitant specific medical condition that would benefit from blood pressure lowering (e.g., MI, aortic dissection).  - In the setting of possible hypovolemia, initiate IV fluids to  maintain adequate hydration and euvolemia (although be cautious in patients at risk of fluid overload, such as those with renal or heart failure).  - The optimal time after the onset of acute ischemic stroke to restart or start long-term antihypertensive therapy has not been established and may depend on various patient and stroke characteristics (e.g., pressure dependent state), but in most patients it is reasonable to initiate long-term antihypertensive therapy after the initial 48 to 72 hours from stroke onset.    Please contact us with any further questions or concerns or if patient has any acute neurological changes (new symptoms, worsening deficits).                 ROS  Physical Exam  Past Medical History:   Diagnosis Date    Breast cancer      Past Surgical History:   Procedure Laterality Date    BREAST BIOPSY      BREAST LUMPECTOMY       Family History   Problem Relation Name Age of Onset    Breast cancer Mother      Breast cancer Daughter         Diagnoses  Embolic: (ESUS: Embolic Stroke Unknown Source) Posterior Cerebral Artery Left    Casi Marrero MD      Emergent/Acute neurological consultation requested by spoke provider due to critical concerns for possible cerebrovascular event that could result in permanent loss of neurologic/bodily function, severe disability or death of this patient.  Immediate/timely evaluation by a highly prepared expert is paramount for optimal outcomes  High risk for neurological deterioration if not properly diagnosed  High risk for neurological deterioration if not treated promplty/as soon as possible  Complex diagnostic evaluation may be required (advanced imaging)  High risk treatment options (thrombolytics and/or thrombectomy)    Patient care was coordinated with spoke provider, including but not limted to    Discussing likely diagnosis/etiology of symptoms  Making recommendations for further diagnostic studies  Making recommendations for intravenous thrombolytics or other  advanced therapies  Making recommendations for disposition (admission/transfer for higher level of care)      Neurology consultation requested by spoke provider. Audiovisual encounter with the patient performed using a secure connection.  Results and impressions from the visit are documented on this note and were communicated to the consulting provider/team via direct communication. The note has been shared for addition to the patients electronic medical record.

## 2025-06-19 NOTE — ED PROVIDER NOTES
Encounter Date: 6/19/2025    SCRIBE #1 NOTE: I, am scribing for, and in the presence of,  Destin Wu DO.       History     Chief Complaint   Patient presents with    Altered Mental Status     This 74 y.o. female pt presents to the ED with c/o AMS, Hyperglycemia and Vomiting. The pt arrived to the ED a bit confused and vomit all over the side of right face and in her right ear. The pt's is more alert now while here int he ED. Pt is able to answer questions and respond to commands. Pt was reported to have been found on the floor of her bathroom LOC laying in her vomit. Pt has Mhx of COPD. There are no other issues to report with this pt at this time.     The history is provided by the EMS personnel. No  was used.     Review of patient's allergies indicates:  No Known Allergies  Past Medical History:   Diagnosis Date    Breast cancer      Past Surgical History:   Procedure Laterality Date    BREAST BIOPSY      BREAST LUMPECTOMY       Family History   Problem Relation Name Age of Onset    Breast cancer Mother      Breast cancer Daughter       Social History[1]  Review of Systems   Constitutional:  Negative for activity change, appetite change, chills, diaphoresis and fever.   HENT:  Negative for congestion, drooling, ear pain, mouth sores, rhinorrhea, sinus pain, sore throat and trouble swallowing.    Eyes:  Negative for pain and discharge.   Respiratory:  Negative for cough, chest tightness, shortness of breath, wheezing and stridor.    Cardiovascular:  Negative for chest pain, palpitations and leg swelling.   Gastrointestinal:  Positive for vomiting. Negative for abdominal distention, abdominal pain, blood in stool, constipation, diarrhea and nausea.   Endocrine:        Pt has DM   Genitourinary:  Negative for difficulty urinating, dysuria, flank pain, frequency, hematuria and urgency.   Musculoskeletal:  Negative for arthralgias, back pain and myalgias.   Skin:  Negative for pallor, rash  and wound.   Neurological:  Positive for syncope. Negative for dizziness, weakness, light-headedness and numbness.   Psychiatric/Behavioral:  Positive for confusion.    All other systems reviewed and are negative.      Physical Exam     Initial Vitals [06/19/25 1247]   BP Pulse Resp Temp SpO2   124/69 (!) 130 (!) 22 100 °F (37.8 °C) (!) 92 %      MAP       --         Physical Exam    Nursing note and vitals reviewed.  Constitutional: She appears well-developed and well-nourished.   HENT:   Head: Normocephalic and atraumatic.   Right Ear: External ear normal.   Left Ear: External ear normal.   Nose: Nose normal. Mouth/Throat: Oropharynx is clear and moist.   Eyes: Conjunctivae and EOM are normal. Pupils are equal, round, and reactive to light.   Neck: Neck supple.   Normal range of motion.  Cardiovascular:  Regular rhythm, normal heart sounds and intact distal pulses.   Tachycardia present.         Pulmonary/Chest: Breath sounds normal.   Abdominal: Abdomen is soft. Bowel sounds are normal.   Genitourinary:    Vagina and uterus normal.     Musculoskeletal:         General: Normal range of motion.      Cervical back: Normal range of motion and neck supple.     Neurological: She is alert and oriented to person, place, and time. She has normal strength and normal reflexes.   Skin: Skin is warm. Capillary refill takes less than 2 seconds.   Psychiatric: She has a normal mood and affect. Her behavior is normal. Judgment and thought content normal.         ED Course   Procedures  Labs Reviewed   COMPREHENSIVE METABOLIC PANEL - Abnormal       Result Value    Sodium 135 (*)     Potassium 4.6      Chloride 99      CO2 13 (*)     Anion Gap 28 (*)     Glucose 450 (*)     BUN 17      Creatinine 1.16 (*)     BUN/Creatinine Ratio 15      Calcium 9.5      Total Protein 8.1 (*)     Albumin 3.8      Globulin 4.3 (*)     A/G Ratio 0.9      Bilirubin, Total 0.4      Alk Phos 146      ALT 11      AST 19      eGFR 49 (*)    NT-PRO  NATRIURETIC PEPTIDE - Abnormal    ProBNP 547 (*)    LIPID PANEL - Abnormal    Triglycerides 103      Cholesterol 234 (*)     HDL Cholesterol 70 (*)     Cholesterol/HDL Ratio (Risk Factor) 3.3      Non-      LDL Calculated 143      LDL/HDL 2.0      VLDL 21     CBC WITH DIFFERENTIAL - Abnormal    WBC 16.47 (*)     RBC 4.52      Hemoglobin 13.9      Hematocrit 43.2      MCV 95.6      MCH 30.8      MCHC 32.2      RDW 11.8      Platelet Count 253      MPV 12.4      Neutrophils % 89.3 (*)     Lymphocytes % 6.6 (*)     Monocytes % 2.9      Eosinophils % 0.0 (*)     Basophils % 0.3      Immature Granulocytes % 0.9 (*)     nRBC, Auto 0.0      Neutrophils, Abs 14.71 (*)     Lymphocytes, Absolute 1.09      Monocytes, Absolute 0.47      Eosinophils, Absolute 0.00      Basophils, Absolute 0.05      Immature Granulocytes, Absolute 0.15 (*)     nRBC, Absolute 0.00      Diff Type Auto     URINALYSIS, REFLEX TO URINE CULTURE - Abnormal    Color, UA Colorless      Clarity, UA Clear      pH, UA 5.0      Leukocytes, UA Negative      Nitrites, UA Negative      Protein, UA Negative      Glucose, UA >1000 (*)     Ketones, UA >=150 (*)     Urobilinogen, UA Normal      Bilirubin, UA Negative      Blood, UA Trace (*)     Specific Gravity, UA 1.041 (*)    URINALYSIS, MICROSCOPIC - Abnormal    WBC, UA 2      RBC, UA <1      Squamous Epithelial Cells, UA Occasional (*)     Mucous Occasional (*)    POCT GLUCOSE MONITORING CONTINUOUS - Abnormal    POC Glucose 454 (*)    POCT GLUCOSE MONITORING CONTINUOUS - Abnormal    POC Glucose 492 (*)    TROPONIN I - Normal    Troponin I High Sensitivity 5.8     TROPONIN I - Normal    Troponin I High Sensitivity 9.2     PROTIME-INR - Normal    PT 13.9      INR 1.06     TSH - Normal    TSH 2.409     CULTURE, BLOOD    Culture, Blood No Growth at 5 days     CULTURE, BLOOD    Culture, Blood No Growth at 5 days     CBC W/ AUTO DIFFERENTIAL    Narrative:     The following orders were created for panel order  CBC auto differential.  Procedure                               Abnormality         Status                     ---------                               -----------         ------                     CBC with Differential[[id number]]       Abnormal            Final result                 Please view results for these tests on the individual orders.   ACETONE    Acetone Positive - Small       EKG Readings: (Independently Interpreted)   Heart Rate: 128 bpm.   Sinus tachycardia  Short SC interval  ST junctional depression is nonspecific  Borderline ECG     ECG Results              EKG 12-lead (Final result)        Collection Time Result Time QRS Duration OHS QTC Calculation    06/19/25 12:43:59 06/25/25 11:43:12 90 431                     Final result by Interface, Lab In [facility] (06/25/25 11:43:18)                   Narrative:    Test Reason : R07.9,    Vent. Rate : 128 BPM     Atrial Rate :    BPM     P-R Int : 100 ms          QRS Dur :  90 ms      QT Int : 312 ms       P-R-T Axes :  76  64  55 degrees    QTcB Int : 431 ms    Sinus tachycardia  Short SC interval  ST junctional depression is nonspecific  Borderline ECG    Confirmed by Shun Villanueva ([id number]) on 6/25/2025 11:43:05 AM    Referred By: AAAREFERRAL SELF           Confirmed By: Shun Villanueva                                  Imaging Results               CTA Neck (Final result)  Result time 06/19/25 14:10:49      Final result by Stephen Stuart MD (06/19/25 14:10:49)                   Impression:      Focal severe stenosis versus near complete occlusion about the origin of the left vertebral artery.    Additional scattered atherosclerosis about the extracranial vasculature as above.  Less than 50% stenosis about the bilateral carotid bulbs per NASCET criteria.    Additional findings as above.    This report was flagged in Epic as abnormal.      Electronically signed by: Stephen Stuart  Date:    06/19/2025  Time:    14:10               Narrative:     EXAMINATION:  CTA NECK    CLINICAL HISTORY:  Neuro deficit, acute, stroke suspected;    TECHNIQUE:  CTA of the neck was performed after the administration of 100 mL intravenous contrast.  Coronal sagittal reformats and MIPS constructions also performed.    COMPARISON:  None.    FINDINGS:  Please see CTA head same day for complete evaluation of intracranial structures.    Visualized innominate, left common carotid, and left subclavian arteries are unremarkable.    Subclavian arteries are unremarkable.    Common carotid arteries demonstrate no significant abnormality.    Calcific atherosclerosis about the left carotid bulb with less than 50% stenosis per NASCET criteria.  Right carotid bulb is unremarkable.  Cervical carotid arteries are patent without evidence for significant stenosis or occlusion.    Focal severe stenosis versus near-total occlusion about the origin of the left vertebral artery (series 611, image 84).  Dominant right vertebral artery.  Scattered calcific atherosclerosis about the extracranial vertebral arteries without evidence for significant stenosis or occlusion.    Lung apices are clear.    Soft tissues of the neck and aero digestive structures are unremarkable.  Osseous structures demonstrate no evidence for acute fracture or osseous destructive lesion.  Degenerative change.  Specifically, focal posterior disc osteophyte complex C5-C6 contributes to severe spinal canal stenosis.                                        CTA Head (Final result)  Result time 06/19/25 14:02:20      Final result by Stephen Stuart MD (06/19/25 14:02:20)                   Impression:      No significant change about the visualized brain, noting focal area hypoattenuation about the left paramedian occipital lobe, concerning for evolving/recent infarct.  No evidence for acute intracranial hemorrhage.    Occlusion about the left distal P2/proximal P3 segment posterior cerebral artery.    Additional scattered intracranial  atherosclerosis, for example a short segment severe stenosis about a left proximal M3 branch.  No intracranial aneurysm.    This report was flagged in Epic as abnormal.      Electronically signed by: Stephen Stuart  Date:    06/19/2025  Time:    14:02               Narrative:    EXAMINATION:  CTA HEAD    CLINICAL HISTORY:  Altered mental status, nontraumatic (Ped 0-18y);    TECHNIQUE:  Non contrast low dose axial images were obtained though the head. CT angiogram was performed from the level of the bottom of C2 to the top of the head following the IV administration of 100 mL of Omnipaque 350.   Sagittal and coronal reconstructions and maximum intensity projection reconstructions were performed.    COMPARISON:  CT head without contrast same day    FINDINGS:  Intracranial Compartment:    Ventricles and sulci are unchanged in size without evidence of hydrocephalus. No extra-axial blood or fluid collections.    The brain parenchyma appears unchanged.  Similar focal area hypoattenuation about the left paramedian occipital lobe.  No worsening in mass effect, edema, or new hemorrhage.    Skull/Extracranial Contents (limited evaluation): No fracture. Mastoid air cells and paranasal sinuses are essentially clear.    Petrous and cavernous portions of the internal carotid arteries are patent without evidence for significant stenosis or occlusion.    Hypoplastic right A1 segment.  Anterior communicating artery is unremarkable.  Anterior cerebral arteries are patent without evidence for significant stenosis or occlusion.    Focal short segment severe stenosis about a left proximal M3 branch (series 605, image 64).  Remainder of the middle cerebral arteries are patent without evidence for significant stenosis or occlusion.    Intracranial vertebral arteries and basilar artery are patent without evidence for significant stenosis or occlusion.    Fetal circulation right PCA.  Right posterior cerebral artery is patent without evidence  for significant stenosis or occlusion.    Left P1 segment is unremarkable.  Occlusion about the left distal P2/proximal P3 posterior cerebral artery (series 3, image 127).    No intracranial aneurysm.    Venous structures (limited evaluation): Normal.                                        CT Head Without Contrast (Final result)  Result time 06/19/25 13:40:17      Final result by Khanh Stewart MD (06/19/25 13:40:17)                   Impression:      This report was flagged in Epic as abnormal.    1. Hypoattenuation involving the left paramedian occipital lobe concerning for acute/subacute infarct.  Correlation with current symptomatology advised, comparison with any previous exams would be helpful.  2. Sequela of chronic microvascular ischemic change and senescent change.      Electronically signed by: Khanh Stewart MD  Date:    06/19/2025  Time:    13:40               Narrative:    EXAMINATION:  CT HEAD WITHOUT CONTRAST    CLINICAL HISTORY:  Neuro deficit, acute, stroke suspected;    TECHNIQUE:  Low dose axial images were obtained through the head.  Coronal and sagittal reformations were also performed. Contrast was not administered.    COMPARISON:  None.    FINDINGS:  There is motion artifact.    There is generalized cerebral volume loss.  There is hypoattenuation in a periventricular fashion, likely sequela of chronic microvascular ischemic change.There is a focus of low attenuation involving the left paramedian occipital lobe.  No intraparenchymal hemorrhage.  There is no hydrocephalus.  There are no abnormal extra-axial fluid collections.  The paranasal sinuses and mastoid air cells are clear, and there is no evidence of calvarial fracture.  The visualized soft tissues are unremarkable.                                       X-Ray Chest 1 View (Final result)  Result time 06/19/25 14:39:16      Final result by Stephen Stuart MD (06/19/25 14:39:16)                   Impression:      Hypoventilatory exam  with coarsened interstitial lung markings.  No focal consolidation, pleural effusion, or pneumothorax.  Correlation is advised.      Electronically signed by: Stephen Stuart  Date:    06/19/2025  Time:    14:39               Narrative:    EXAMINATION:  XR CHEST 1 VIEW    CLINICAL HISTORY:  Altered mental status, unspecified    TECHNIQUE:  Single frontal view of the chest was performed.    COMPARISON:  None    FINDINGS:  Cardiomediastinal silhouette is normal in size.  Mediastinal structures are midline.    Coarsened interstitial lung markings without evidence for focal consolidation, pleural effusion, or pneumothorax.    Osseous structures demonstrate no evidence for acute fracture or osseous destructive lesion.    No free intra-abdominal air.  Surgical staples about the left axillary soft tissues.                                       Medications   sodium chloride 0.9% bolus 1,000 mL 1,000 mL (0 mLs Intravenous Stopped 6/19/25 1415)   insulin regular injection 10 Units 0.1 mL (10 Units Intravenous Given 6/19/25 1315)   iopamidoL (ISOVUE-370) injection 100 mL (100 mLs Intravenous Given 6/19/25 1347)   clopidogreL tablet 300 mg (300 mg Oral Given 6/19/25 1456)   aspirin tablet 325 mg (325 mg Oral Given 6/19/25 1456)   ondansetron injection 4 mg (4 mg Intravenous Given 6/19/25 1552)   acetaminophen suppository 650 mg (650 mg Rectal Given 6/19/25 1615)   pantoprazole injection 80 mg (80 mg Intravenous Given 6/19/25 1657)   insulin regular injection 5 Units 0.05 mL (5 Units Intravenous Given 6/19/25 1747)     Medical Decision Making  Amount and/or Complexity of Data Reviewed  Labs: ordered.  Radiology: ordered.    Risk  OTC drugs.  Prescription drug management.              Attending Attestation:           Physician Attestation for Scribe:  Physician Attestation Statement for Scribe #1: I, Destin Wu, DO, reviewed documentation, as scribed by in my presence, and it is both accurate and complete.                         Medical Decision Making:   Initial Assessment:   This 74 y.o. female pt presents to the ED with c/o AMS, Hyperglycemia and Vomiting. The pt arrived to the ED a bit confused and vomit all over the side of right face and in her right ear. The pt's is more alert now while here int he ED. Pt is able to answer questions and respond to commands. Pt was reported to have been found on the floor of her bathroom LOC laying in her vomit. Pt has Mhx of COPD. There are no other issues to report with this pt at this time.     The history is provided by the EMS personnel. No  was used.     Differential Diagnosis:   Patient with CVA and expressive aphasia with blindness in the right periphery--patient also with vomiting coffee-ground emesis and heme positive emesis             Clinical Impression:  Final diagnoses:  [R07.9] Chest pain  [R41.82] Altered mental status, unspecified  [R29.818] Acute focal neurological deficit  [K92.2] Upper GI bleed (Primary)          ED Disposition Condition    Transfer to Another Facility Stable                      [1]         Destin Wu, DO  06/26/25 0038

## 2025-06-19 NOTE — ED NOTES
Pt vomited large amount of dark brown emesis - heme + when tested;  dr al made aware;  pt remains a/a most speech is still garbled and incomprehensible although there have been a few things that she has said very clear and concise;  resp even and nonlablored;  she remains tachy at 130-140;  BP within her baseline;  family at bedside

## 2025-06-23 LAB — GLUCOSE SERPL-MCNC: 492 MG/DL (ref 70–105)

## 2025-06-25 LAB
BACTERIA BLD CULT: NORMAL
BACTERIA BLD CULT: NORMAL
OHS QRS DURATION: 90 MS
OHS QTC CALCULATION: 431 MS